# Patient Record
Sex: FEMALE | Employment: UNEMPLOYED | URBAN - METROPOLITAN AREA
[De-identification: names, ages, dates, MRNs, and addresses within clinical notes are randomized per-mention and may not be internally consistent; named-entity substitution may affect disease eponyms.]

---

## 2018-09-14 ENCOUNTER — TRANSFERRED RECORDS (OUTPATIENT)
Dept: HEALTH INFORMATION MANAGEMENT | Facility: CLINIC | Age: 58
End: 2018-09-14

## 2019-09-16 ENCOUNTER — TRANSFERRED RECORDS (OUTPATIENT)
Dept: HEALTH INFORMATION MANAGEMENT | Facility: CLINIC | Age: 59
End: 2019-09-16

## 2019-12-12 ENCOUNTER — TRANSCRIBE ORDERS (OUTPATIENT)
Dept: OTHER | Age: 59
End: 2019-12-12

## 2019-12-12 DIAGNOSIS — K43.9 VENTRAL HERNIA: Primary | ICD-10-CM

## 2019-12-16 ENCOUNTER — DOCUMENTATION ONLY (OUTPATIENT)
Dept: CARE COORDINATION | Facility: CLINIC | Age: 59
End: 2019-12-16

## 2019-12-18 ENCOUNTER — TRANSFERRED RECORDS (OUTPATIENT)
Dept: HEALTH INFORMATION MANAGEMENT | Facility: CLINIC | Age: 59
End: 2019-12-18

## 2020-01-03 ENCOUNTER — TELEPHONE (OUTPATIENT)
Dept: SURGERY | Facility: CLINIC | Age: 60
End: 2020-01-03

## 2020-01-03 NOTE — TELEPHONE ENCOUNTER
Pre Visit Call and Assessment    Date of call:  01/03/2020    Phone numbers:  Home number on file [phone] (home)    Reached patient/confirmed appointment:  Yes  Patient care team/Primary provider:  No primary care provider on file.  Preferred outpatient pharmacy:  No Pharmacies Listed  Referred to:  Dr. Néstor Roblero    Reason for visit:  Ventral hernia consult    Problem List:  There is no problem list on file for this patient.    Allergies:   Allergies not on file    History:      No past medical history on file.    No past surgical history on file.    No family history on file.    Social History     Tobacco Use     Smoking status: Not on file   Substance Use Topics     Alcohol use: Not on file       Patient instructions:    *  Bring outside medical records, images/disc, and/or studies

## 2020-01-06 ENCOUNTER — OFFICE VISIT (OUTPATIENT)
Dept: SURGERY | Facility: CLINIC | Age: 60
End: 2020-01-06
Payer: COMMERCIAL

## 2020-01-06 VITALS
DIASTOLIC BLOOD PRESSURE: 83 MMHG | WEIGHT: 221 LBS | HEIGHT: 63 IN | HEART RATE: 75 BPM | OXYGEN SATURATION: 97 % | SYSTOLIC BLOOD PRESSURE: 133 MMHG | BODY MASS INDEX: 39.16 KG/M2

## 2020-01-06 DIAGNOSIS — K43.2 INCISIONAL HERNIA, WITHOUT OBSTRUCTION OR GANGRENE: Primary | ICD-10-CM

## 2020-01-06 RX ORDER — SELENIUM 50 MCG
1 TABLET ORAL
COMMUNITY

## 2020-01-06 RX ORDER — LATANOPROST 50 UG/ML
SOLUTION/ DROPS OPHTHALMIC
COMMUNITY
Start: 2019-11-18

## 2020-01-06 RX ORDER — VIT C/B6/B5/MAGNESIUM/HERB 173 50-5-6-5MG
500 CAPSULE ORAL
COMMUNITY

## 2020-01-06 RX ORDER — CITALOPRAM HYDROBROMIDE 20 MG/1
20 TABLET ORAL
COMMUNITY
Start: 2015-12-04

## 2020-01-06 ASSESSMENT — MIFFLIN-ST. JEOR: SCORE: 1546.58

## 2020-01-06 ASSESSMENT — PAIN SCALES - GENERAL: PAINLEVEL: NO PAIN (0)

## 2020-01-06 NOTE — PROGRESS NOTES
Jessica Irwin comes to clinic with a recurrent ventral incisional hernia.  Her abdominal history started with hysterectomy.  This resulted in an incisional hernia that was repaired with mesh in 2007.  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  Dr. Phelps at the [facility] took down the fistula remove the mesh and did a primary ventral herniorrhaphy with PDS and interrupted Prolene suture.  This was complicated by wound infection and she had a long-term VAC in place.  She had several abdominal wall debridements and removal of suture.  Recently she is noticed a bulge in the upper abdomen.  It causes some pressure and difficulty breathing from time to time.  It is never been painful.    A/P: Recurrent ventral incisional hernia after a multiple operated abdominal wall.  This will need a mesh repair.  She is currently morbidly obese with BMI of 26 and she is a smoker.  I instructed her that she will need this completely stop smoking and lose at least 21 pounds prior to hernia repair.  At that time we would also need to get a CT scan of the abdomen for a roadmap and planning purposes.  She will return to see me in 6 months time.  I offered her dietitian consult but she would like to get that closer to home with her primary care physician.    I discussed the likely need for a repair with mesh, the multiple sites of repair that would be determined by the CT scan, and the risks of infection and recurrence especially related to her obesity and smoking.  She seems motivated and we will see her in 6 months time.    I spent 30 minutes with the patient over half that time discussing the risks benefits alternatives of surgery.    Danial Roblero MD    No past medical history on file.    No past surgical history on file.   Open cholecystectomy  Hysterectomy  Ventral incisional herniorrhaphy with mesh  Resection of enterocutaneous fistula, removal of mesh, and primary hernia repair 2019 [place]       Allergies   Allergen  "Reactions     Clarithromycin Hives and Itching     Hydrocodone-Acetaminophen Nausea and Vomiting     Morphine Nausea and Vomiting, Nausea and GI Disturbance     vomiting       Oxycodone-Acetaminophen Hives, Nausea and Vomiting and Other (See Comments)     vomiting       Propoxyphene N-Apap Hives, Nausea and Vomiting and Other (See Comments)     vomiting       Droperidol Anxiety and Itching     Other reaction(s): Confusion, Diaphoresis, Other (see comments)  Fight or floght  \"fight or flight mode\"           Current Outpatient Medications:      citalopram (CELEXA) 20 MG tablet, Take 20 mg by mouth, Disp: , Rfl:      Lactobacillus (ACIDOPHILUS) CAPS, Take 1 capsule by mouth, Disp: , Rfl:      latanoprost (XALATAN) 0.005 % ophthalmic solution, INSTILL 1 DROP INTO EACH EYE AT BEDTIME, Disp: , Rfl:      Turmeric 500 MG CAPS, Take 500 mg by mouth, Disp: , Rfl:      vitamin B-12 (CYANOCOBALAMIN) 100 MCG tablet, Take 100 mcg by mouth daily, Disp: , Rfl:     family history is not on file.    Social History     Tobacco Use     Smoking status: Current Every Day Smoker     Packs/day: 1.00     Types: Cigarettes     Start date: 1975     Smokeless tobacco: Never Used     Tobacco comment: working on quitting with PCP   Substance Use Topics     Alcohol use: None     Drug use: None       EXAM  /83 (BP Location: Left arm, Patient Position: Chair, Cuff Size: Adult Regular)   Pulse 75   Ht 1.6 m (5' 3\")   Wt 100.2 kg (221 lb)   SpO2 97%   BMI 39.15 kg/m      Obese, midline scar, wide-based healed by secondary intention.  RUQ vertical scar.  LLQ horizontal scar  Hernia mass palpable in upper abdomen  "

## 2020-01-06 NOTE — LETTER
1/6/2020       RE: Jessica Irwin  [address] [address] [address]     Dear Colleague,    Thank you for referring your patient, Jessica Irwin, to the [facility] GENERAL SURGERY at [facility]. Please see a copy of my visit note below.    Jessica Irwin comes to clinic with a recurrent ventral incisional hernia.  Her abdominal history started with hysterectomy.  This resulted in an incisional hernia that was repaired with mesh in 2007.  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  Dr. Phelps at the [facility] took down the fistula remove the mesh and did a primary ventral herniorrhaphy with PDS and interrupted Prolene suture.  This was complicated by wound infection and she had a long-term VAC in place.  She had several abdominal wall debridements and removal of suture.  Recently she is noticed a bulge in the upper abdomen.  It causes some pressure and difficulty breathing from time to time.  It is never been painful.    A/P: Recurrent ventral incisional hernia after a multiple operated abdominal wall.  This will need a mesh repair.  She is currently morbidly obese with BMI of 26 and she is a smoker.  I instructed her that she will need this completely stop smoking and lose at least 21 pounds prior to hernia repair.  At that time we would also need to get a CT scan of the abdomen for a roadmap and planning purposes.  She will return to see me in 6 months time.  I offered her dietitian consult but she would like to get that closer to home with her primary care physician.    I discussed the likely need for a repair with mesh, the multiple sites of repair that would be determined by the CT scan, and the risks of infection and recurrence especially related to her obesity and smoking.  She seems motivated and we will see her in 6 months time.    I spent 30 minutes with the patient over half that time discussing the risks benefits alternatives of surgery.    Danial HOFFMAN  "MD Osbaldo    No past medical history on file.    No past surgical history on file.   Open cholecystectomy  Hysterectomy  Ventral incisional herniorrhaphy with mesh  Resection of enterocutaneous fistula, removal of mesh, and primary hernia repair 2019 [place]       Allergies   Allergen Reactions     Clarithromycin Hives and Itching     Hydrocodone-Acetaminophen Nausea and Vomiting     Morphine Nausea and Vomiting, Nausea and GI Disturbance     vomiting       Oxycodone-Acetaminophen Hives, Nausea and Vomiting and Other (See Comments)     vomiting       Propoxyphene N-Apap Hives, Nausea and Vomiting and Other (See Comments)     vomiting       Droperidol Anxiety and Itching     Other reaction(s): Confusion, Diaphoresis, Other (see comments)  Fight or floght  \"fight or flight mode\"           Current Outpatient Medications:      citalopram (CELEXA) 20 MG tablet, Take 20 mg by mouth, Disp: , Rfl:      Lactobacillus (ACIDOPHILUS) CAPS, Take 1 capsule by mouth, Disp: , Rfl:      latanoprost (XALATAN) 0.005 % ophthalmic solution, INSTILL 1 DROP INTO EACH EYE AT BEDTIME, Disp: , Rfl:      Turmeric 500 MG CAPS, Take 500 mg by mouth, Disp: , Rfl:      vitamin B-12 (CYANOCOBALAMIN) 100 MCG tablet, Take 100 mcg by mouth daily, Disp: , Rfl:     family history is not on file.    Social History     Tobacco Use     Smoking status: Current Every Day Smoker     Packs/day: 1.00     Types: Cigarettes     Start date: 1975     Smokeless tobacco: Never Used     Tobacco comment: working on quitting with PCP   Substance Use Topics     Alcohol use: None     Drug use: None       EXAM  /83 (BP Location: Left arm, Patient Position: Chair, Cuff Size: Adult Regular)   Pulse 75   Ht 1.6 m (5' 3\")   Wt 100.2 kg (221 lb)   SpO2 97%   BMI 39.15 kg/m       Obese, midline scar, wide-based healed by secondary intention.  RUQ vertical scar.  LLQ horizontal scar  Hernia mass palpable in upper abdomen    Again, thank you for allowing me to " participate in the care of your patient.      Sincerely,    Danial Roblero MD

## 2020-01-06 NOTE — NURSING NOTE
"Chief Complaint   Patient presents with     Consult     new pt here for ventral hernia consult, CT in Epic and s/p repair 2006 and then mesh removal late 2017 at [facility]       Vitals:    01/06/20 1129   BP: 133/83   BP Location: Left arm   Patient Position: Chair   Cuff Size: Adult Regular   Pulse: 75   SpO2: 97%   Weight: 100.2 kg (221 lb)   Height: 1.6 m (5' 3\")       Body mass index is 39.15 kg/m .    True Mckinney, EMT    "

## 2020-01-06 NOTE — PATIENT INSTRUCTIONS
You met with Dr. Néstor Roblero.      Today's visit instructions:    Dr. Roblero would like to see you back in clinic in 6 months. You can call to schedule this follow up in mid-May or early . If you have reached your goal weight prior to this appointment, please call the RN line so they can place an order for a CT.     Please work on:  1. Weight loss of 21 lbs to a goal weight of 200 lbs.  2. Smoking cessation.      If you have questions please contact Gio RN or Kallie RN during regular clinic hours, Monday through Friday 7:30 AM - 4:00 PM, or you can contact us via imagoo at anytime.       If you have urgent needs after-hours, weekends, or holidays please call the hospital at [phone] and ask to speak with our on-call General Surgery Team.    Appointment [phone], option #1   Nurse Advice (Gio or Kallie): [phone]   Surgery Scheduler (Jesusita): [phone]  Fax: [phone]

## 2020-04-15 ENCOUNTER — TRANSFERRED RECORDS (OUTPATIENT)
Dept: HEALTH INFORMATION MANAGEMENT | Facility: CLINIC | Age: 60
End: 2020-04-15

## 2020-04-17 ENCOUNTER — TRANSFERRED RECORDS (OUTPATIENT)
Dept: HEALTH INFORMATION MANAGEMENT | Facility: CLINIC | Age: 60
End: 2020-04-17

## 2020-04-17 ENCOUNTER — MEDICAL CORRESPONDENCE (OUTPATIENT)
Dept: HEALTH INFORMATION MANAGEMENT | Facility: CLINIC | Age: 60
End: 2020-04-17

## 2020-04-24 ENCOUNTER — MEDICAL CORRESPONDENCE (OUTPATIENT)
Dept: HEALTH INFORMATION MANAGEMENT | Facility: CLINIC | Age: 60
End: 2020-04-24

## 2020-05-01 ENCOUNTER — TELEPHONE (OUTPATIENT)
Dept: SURGERY | Facility: CLINIC | Age: 60
End: 2020-05-01

## 2020-05-01 NOTE — TELEPHONE ENCOUNTER
Returned call to patient.  She reports that the hernia has become increasingly tender and larger in size.  She is tolerating po, up ad marya, and her bowels are moving.  She denies obstructive symptoms or fever.  She continues to smoke and has not net her weight loss goal.    Recommended that she retrial use of an abdominal binder as tolerated, OTC analgesics PRN per package instructions, and she can alternate heat/ice to the area PRN (protecting skin from injury).  New images pushed into PACS.    Discussed symptoms that require emergent medical care.  All of her questions were answered.    Video appointment arranged with Dr. Roblero 5/4 at 1330.

## 2020-05-01 NOTE — TELEPHONE ENCOUNTER
[facility] Call Center    Phone Message    May a detailed message be left on voicemail: yes     Reason for Call: Other: Pt calling wanting Dr. Roblero to know that notes from referring provider and CT image has been recievede. Pt is concerned she is expirencing Symptoms getting worse pockets of fluid under the skin now is having pain for last 3 days, 1-10 pain is at a 5 consistently especially when moving around, moving down to pubic area.. Pt asking for a call back as soon as possible about symptoms and next steps     Action Taken: Message routed to:  [facility] Gen [facility]    Travel Screening: Not Applicable

## 2020-05-01 NOTE — TELEPHONE ENCOUNTER
Action 05/01/20 12 PM - Lizzie   Action Taken  Imaging disc and report received from [facility] and sent to [facility] to be uploaded into PACs.

## 2020-05-01 NOTE — TELEPHONE ENCOUNTER
"[facility] Call Center    Phone Message    May a detailed message be left on voicemail: yes     Reason for Call: pt reported that she received a voice msg this morning from our clinic stating that clinic had received more paperwork and that Dr. Roblero wanted pt to make an appt with him. Msg did not indicate whether or not it was an \"in clinic\" appt or a video visit. Please determine which visit is needed so pt can get scheduled with Dr. Roblero. Thank you.    Action Taken: Message routed to:  [facility]  General Surgery    Travel Screening: Not Applicable                                                                      "

## 2020-05-04 ENCOUNTER — VIRTUAL VISIT (OUTPATIENT)
Dept: SURGERY | Facility: CLINIC | Age: 60
End: 2020-05-04
Payer: COMMERCIAL

## 2020-05-04 DIAGNOSIS — K43.2 INCISIONAL HERNIA, WITHOUT OBSTRUCTION OR GANGRENE: Primary | ICD-10-CM

## 2020-05-04 ASSESSMENT — PAIN SCALES - GENERAL: PAINLEVEL: MODERATE PAIN (5)

## 2020-05-04 NOTE — PATIENT INSTRUCTIONS
You met with Dr. Néstor Roblero.      Today's visit instructions:    Please continue to work on smoking cessation. You will need to have be nicotine free for 6 weeks. Once you have reached this goal, please contact the office to get set up with a Nicotine test. You will also need to have a PAC visit prior to surgery. Our office will help arrange this once you have a negative test.     Dr. Roblero will want to review your CT scan from . He may want a repeat CT depending on the findings. Our office will let you know his recommendations.       If you have questions please contact Gio RN or Kallie RN during regular clinic hours, Monday through Friday 7:30 AM - 4:00 PM, or you can contact us via Amaru at anytime.       If you have urgent needs after-hours, weekends, or holidays please call the hospital at [phone] and ask to speak with our on-call General Surgery Team.    Appointment [phone], option #1   Nurse Advice (Gio or Kallie): [phone]   Surgery Scheduler (Jesusita): [phone]  Fax: [phone]

## 2020-05-04 NOTE — PROGRESS NOTES
"Jessica Irwin is a 59 year old female who is being evaluated via a billable video visit.      The patient has been notified of following:     \"This video visit will be conducted via a call between you and your physician/provider. We have found that certain health care needs can be provided without the need for an in-person physical exam.  This service lets us provide the care you need with a video conversation.  If a prescription is necessary we can send it directly to your pharmacy.  If lab work is needed we can place an order for that and you can then stop by our lab to have the test done at a later time.    Video visits are billed at different rates depending on your insurance coverage.  Please reach out to your insurance provider with any questions.    If during the course of the call the physician/provider feels a video visit is not appropriate, you will not be charged for this service.\"    Patient has given verbal consent for Video visit? Yes    How would you like to obtain your AVS? [employer]    Patient would like the video invitation sent by: Text to cell phone: [phone]    Will anyone else be joining your video visit? No        Video-Visit Details    Type of service:  Video Visit    Video Start Time: 1336  Video End Time: 1346    Originating Location (pt. Location): Home    Distant Location (provider location):  [facility] SURGERY     Platform used for Video Visit: Webee    Ms. Irwin was seen in January with a recurrent ventral incisional hernia.  See my note from then for further details.  She reports that she felt discomfort in the lower abdomen, suprapubic area with some redness and warmth.  She was seen in a local hospital and had a CT scan.  She reports it showed inflammation and maybe some fluid and possibly a fistula forming.  Since she has felt better.  No change in bowel or bladder function.  No fevers or chills.   She is still smoking.  I don't have the images to " review.  Recommendation is to quite smoking.  We will assess her for surgery when she has quit smoking for 6 weeks.  We made need to repeat the CT imaging to evaluate for active inflammation/infection in the pannus depending on what is seen on exam and on the previous imaging.        Danial Roblero MD

## 2020-05-04 NOTE — NURSING NOTE
Chief Complaint   Patient presents with     RECHECK     Pt having virtual follow up       There were no vitals filed for this visit.    There is no height or weight on file to calculate BMI.      LIZA Barrow NREMT

## 2020-06-12 ENCOUNTER — TELEPHONE (OUTPATIENT)
Dept: SURGERY | Facility: CLINIC | Age: 60
End: 2020-06-12

## 2020-06-12 NOTE — TELEPHONE ENCOUNTER
Patient met virtually with Dr. Roblero recently via a video visit.  She is calling at this time requesting to move forward planning for a hernia repair.      Patient did have a [facility]  The report and images are pushed to this system.  Jessica reports that she quit smoking about 8 weeks ago but is still chewing Nicorette Gum.  She is aware that a urine nicotine level will need to be obtained and she will also need to stop the gum.    An in-person visit was arranged on 7/20 with Dr. Roblero for an exam and lab test.

## 2020-06-12 NOTE — TELEPHONE ENCOUNTER
[facility] Call Center    Phone Message    May a detailed message be left on voicemail: yes     Reason for Call: Other: Pt would like Dr Roblero to set up orders for her to have blood work and CT done for her.  She also needs a call back to discuss this growth she mentioned to the Dr on last visit.  It is grown and she is concerned about it.  Pt wants to know if the Dr needs to see her for this growth.  Please call     Action Taken: Message routed to:  [facility] surgery    Travel Screening: Not Applicable

## 2020-06-17 ENCOUNTER — PATIENT OUTREACH (OUTPATIENT)
Dept: SURGERY | Facility: CLINIC | Age: 60
End: 2020-06-17

## 2020-06-17 NOTE — PROGRESS NOTES
Patient is scheduled to meet in-person with Dr. Roblero on 7/20.  Unfortunately, there is a scheduling conflict and this appointment will need to be rescheduled.  Appointment moved to 7/6 at 1 PM.    Attempted to reach patient with no answer. LM on VM to call office.  Direct contact information provided.

## 2020-06-17 NOTE — PROGRESS NOTES
Patient returning call. Offered to reschedule her for an appointment with Dr. Roblero 7/6. She states she will be on vacation 6/23-7/7. Discussed that Dr. Roblero doesn't have another in clinic appointment until 8/24. She has been scheduled for 11 am. She is aware she will be contacted if Dr. Roblero would like an updated CT prior to the appointment.

## 2020-06-22 ENCOUNTER — TELEPHONE (OUTPATIENT)
Dept: SURGERY | Facility: CLINIC | Age: 60
End: 2020-06-22

## 2020-06-22 NOTE — TELEPHONE ENCOUNTER
This writer called the patient to discuss rescheduling an appointment for her to see a general surgeon for a hernia consult. There was no answer and this writer left a message that confirmed appointment on August / 10 / 2020 at 11:00 AM with Dr. Néstor Roblero. Patient was asked to call if she needs to reschedule her consult.

## 2020-06-29 ENCOUNTER — PATIENT OUTREACH (OUTPATIENT)
Dept: SURGERY | Facility: CLINIC | Age: 60
End: 2020-06-29

## 2020-06-29 NOTE — PROGRESS NOTES
Dr. Roblero has reviewed patients chart/Ct images. He states she does not need to have one done prior to her appointment in August. He will decide after that visit if any imaging needs to be done.

## 2020-08-07 ENCOUNTER — PATIENT OUTREACH (OUTPATIENT)
Dept: SURGERY | Facility: CLINIC | Age: 60
End: 2020-08-07

## 2020-08-07 NOTE — PROGRESS NOTES
Pre Visit Call and Assessment    Date of call:  08/07/2020    Phone numbers:  Home number on file [phone] (home)    Reached patient/confirmed appointment:  No - left message:   on voicemail  Patient care team/Primary provider:  No Ref-Primary, Physician  Preferred outpatient pharmacy:    [facility] [address] [place] [id number]  Phone: [phone] Fax: [phone]    Referred to:  Dr. Néstor Roblero    Reason for visit:  Incisional hernia

## 2020-08-10 ENCOUNTER — OFFICE VISIT (OUTPATIENT)
Dept: SURGERY | Facility: CLINIC | Age: 60
End: 2020-08-10
Payer: COMMERCIAL

## 2020-08-10 VITALS
SYSTOLIC BLOOD PRESSURE: 136 MMHG | DIASTOLIC BLOOD PRESSURE: 82 MMHG | BODY MASS INDEX: 40.18 KG/M2 | HEART RATE: 70 BPM | TEMPERATURE: 98.2 F | OXYGEN SATURATION: 96 % | WEIGHT: 226.8 LBS | HEIGHT: 63 IN

## 2020-08-10 DIAGNOSIS — K43.2 INCISIONAL HERNIA, WITHOUT OBSTRUCTION OR GANGRENE: Primary | ICD-10-CM

## 2020-08-10 SDOH — HEALTH STABILITY: MENTAL HEALTH: HOW OFTEN DO YOU HAVE A DRINK CONTAINING ALCOHOL?: NEVER

## 2020-08-10 ASSESSMENT — PAIN SCALES - GENERAL: PAINLEVEL: NO PAIN (0)

## 2020-08-10 ASSESSMENT — MIFFLIN-ST. JEOR: SCORE: 1572.89

## 2020-08-10 NOTE — NURSING NOTE
"Chief Complaint   Patient presents with     Hernia     Follow up to discuss hernia.       Vitals:    08/10/20 1103   BP: 136/82   BP Location: Left arm   Patient Position: Sitting   Cuff Size: Adult Large   Pulse: 70   Temp: 98.2  F (36.8  C)   TempSrc: Oral   SpO2: 96%   Weight: 102.9 kg (226 lb 12.8 oz)   Height: 1.6 m (5' 3\")       Body mass index is 40.18 kg/m .    Francisco Mejia LPN                     "

## 2020-08-10 NOTE — PATIENT INSTRUCTIONS
You met with Dr. Néstor Roblero.      Today's visit instructions:    Return to the Surgery Clinic in 6 months for a follow up on your weight loss and smoking cessation. Our office will contact you to help schedule once Dr. Roblero's schedule is available.     Margarita, our Wound Care RN will see you today for your abdominal wound. Continue to follow with your local wound clinic until your wound heals.    We have placed a referral for the [facility]. You can stop up front today to help arrange an appointment.       If you have questions please contact Gio GARCIA or Kallie RN during regular clinic hours, Monday through Friday 7:30 AM - 4:00 PM, or you can contact us via CircleCI at anytime.       If you have urgent needs after-hours, weekends, or holidays please call the hospital at [phone] and ask to speak with our on-call General Surgery Team.    Appointment [phone], option #1   Nurse Advice (Gio or Kallie): [phone]   Surgery Scheduler (Jesusita): [phone]  Fax: [phone]

## 2020-08-10 NOTE — LETTER
8/10/2020       RE: Jessica Irwin  [address] [facility] [address]     Dear Colleague,    Thank you for referring your patient, Jessica Irwin, to the [facility] GENERAL SURGERY at [facility]. Please see a copy of my visit note below.    Jessica Irwin returns to seem me for a ventral incisional hernia.  I first saw her about a year ago.  She had a ventral incisional hernia in the setting of obesity and smoking.  We instructed her to stop smoking and lose weight.  Her weight loss goal is 21 pounds.  Since then she has been able to stop smoking but has not lost any weight.  Additionally she had an episode of a lower abdominal wall abscess that required surgical drainage.  She has been doing dressing changes to that area and it is mostly closed.  She has had no complications related to the hernia.    A/P: Longstanding ventral incisional hernia.  I think we can do mesh repair with some separation of parts.  Patient has not been successful at losing weight.  I think at this point we can move forward because she has stopped smoking.  I would like to see the lower abdominal wall abscess completely closed and without recurrence for a period of time.  Because we have some time before surgery we will refer her to the medical weight loss clinic to see if they can help.  We will see her again in about 6 months time to schedule surgery after we verify that the abdominal wall abscess is completely closed.    I spent 20 minutes with the patient and over half that time in counseling.    Danial Roblero MD    No past medical history on file.    No past surgical history on file.       Allergies   Allergen Reactions     Clarithromycin Hives and Itching     Hydrocodone-Acetaminophen Nausea and Vomiting     Morphine Nausea and Vomiting, Nausea and GI Disturbance     vomiting       Oxycodone-Acetaminophen Hives, Nausea and Vomiting and Other (See Comments)     vomiting       Propoxyphene  "N-Apap Hives, Nausea and Vomiting and Other (See Comments)     vomiting       Droperidol Anxiety and Itching     Other reaction(s): Confusion, Diaphoresis, Other (see comments)  Fight or floght  \"fight or flight mode\"           Current Outpatient Medications:      citalopram (CELEXA) 20 MG tablet, Take 20 mg by mouth, Disp: , Rfl:      Lactobacillus (ACIDOPHILUS) CAPS, Take 1 capsule by mouth, Disp: , Rfl:      latanoprost (XALATAN) 0.005 % ophthalmic solution, INSTILL 1 DROP INTO EACH EYE AT BEDTIME, Disp: , Rfl:      Turmeric 500 MG CAPS, Take 500 mg by mouth, Disp: , Rfl:      vitamin B-12 (CYANOCOBALAMIN) 100 MCG tablet, Take 100 mcg by mouth daily, Disp: , Rfl:     family history is not on file.    Social History     Tobacco Use     Smoking status: Former Smoker     Packs/day: 1.00     Types: Cigarettes     Start date:      Last attempt to quit: 2020     Years since quittin.3     Smokeless tobacco: Never Used   Substance Use Topics     Alcohol use: Not Currently     Frequency: Never     Drug use: Never       EXAM  /82 (BP Location: Left arm, Patient Position: Sitting, Cuff Size: Adult Large)   Pulse 70   Temp 98.2  F (36.8  C) (Oral)   Ht 1.6 m (5' 3\")   Wt 102.9 kg (226 lb 12.8 oz)   SpO2 96%   BMI 40.18 kg/m      abd obese, soft, nontender.  Hernia soft and reducible.  Lower midline open wound. Small and granulating.    Again, thank you for allowing me to participate in the care of your patient.      Sincerely,    Danial Roblero MD      "

## 2020-08-10 NOTE — PROGRESS NOTES
"Jessica Irwin returns to seem me for a ventral incisional hernia.  I first saw her about a year ago.  She had a ventral incisional hernia in the setting of obesity and smoking.  We instructed her to stop smoking and lose weight.  Her weight loss goal is 21 pounds.  Since then she has been able to stop smoking but has not lost any weight.  Additionally she had an episode of a lower abdominal wall abscess that required surgical drainage.  She has been doing dressing changes to that area and it is mostly closed.  She has had no complications related to the hernia.    A/P: Longstanding ventral incisional hernia.  I think we can do mesh repair with some separation of parts.  Patient has not been successful at losing weight.  I think at this point we can move forward because she has stopped smoking.  I would like to see the lower abdominal wall abscess completely closed and without recurrence for a period of time.  Because we have some time before surgery we will refer her to the medical weight loss clinic to see if they can help.  We will see her again in about 6 months time to schedule surgery after we verify that the abdominal wall abscess is completely closed.    I spent 20 minutes with the patient and over half that time in counseling.    Danial Roblero MD    No past medical history on file.    No past surgical history on file.       Allergies   Allergen Reactions     Clarithromycin Hives and Itching     Hydrocodone-Acetaminophen Nausea and Vomiting     Morphine Nausea and Vomiting, Nausea and GI Disturbance     vomiting       Oxycodone-Acetaminophen Hives, Nausea and Vomiting and Other (See Comments)     vomiting       Propoxyphene N-Apap Hives, Nausea and Vomiting and Other (See Comments)     vomiting       Droperidol Anxiety and Itching     Other reaction(s): Confusion, Diaphoresis, Other (see comments)  Fight or floght  \"fight or flight mode\"           Current Outpatient Medications:      citalopram " "(CELEXA) 20 MG tablet, Take 20 mg by mouth, Disp: , Rfl:      Lactobacillus (ACIDOPHILUS) CAPS, Take 1 capsule by mouth, Disp: , Rfl:      latanoprost (XALATAN) 0.005 % ophthalmic solution, INSTILL 1 DROP INTO EACH EYE AT BEDTIME, Disp: , Rfl:      Turmeric 500 MG CAPS, Take 500 mg by mouth, Disp: , Rfl:      vitamin B-12 (CYANOCOBALAMIN) 100 MCG tablet, Take 100 mcg by mouth daily, Disp: , Rfl:     family history is not on file.    Social History     Tobacco Use     Smoking status: Former Smoker     Packs/day: 1.00     Types: Cigarettes     Start date:      Last attempt to quit: 2020     Years since quittin.3     Smokeless tobacco: Never Used   Substance Use Topics     Alcohol use: Not Currently     Frequency: Never     Drug use: Never       EXAM  /82 (BP Location: Left arm, Patient Position: Sitting, Cuff Size: Adult Large)   Pulse 70   Temp 98.2  F (36.8  C) (Oral)   Ht 1.6 m (5' 3\")   Wt 102.9 kg (226 lb 12.8 oz)   SpO2 96%   BMI 40.18 kg/m      abd obese, soft, nontender.  Hernia soft and reducible.  Lower midline open wound. Small and granulating.  "

## 2020-08-12 ENCOUNTER — TELEPHONE (OUTPATIENT)
Dept: SURGERY | Facility: CLINIC | Age: 60
End: 2020-08-12

## 2020-08-12 NOTE — TELEPHONE ENCOUNTER
[facility] Call Center    Phone Message    May a detailed message be left on voicemail: yes     Reason for Call: Order(s): Other:   Reason for requested: Nicotine and Cotinine Urine   Date needed: ASAP  Provider name: Dr. Roblero  Please fax orders to Dr. Navas at [phone]    Action Taken: Message routed to:  [facility]    Travel Screening: Not Applicable

## 2020-08-17 ENCOUNTER — TELEPHONE (OUTPATIENT)
Dept: SURGERY | Facility: CLINIC | Age: 60
End: 2020-08-17

## 2020-08-17 ENCOUNTER — TRANSFERRED RECORDS (OUTPATIENT)
Dept: HEALTH INFORMATION MANAGEMENT | Facility: CLINIC | Age: 60
End: 2020-08-17

## 2020-08-17 NOTE — TELEPHONE ENCOUNTER
[facility] Call Center    Phone Message    May a detailed message be left on voicemail: yes     Reason for Call: Other: Patient called to let Dr. Roblero know that the Nicotine and Continine Urine test was done today at patient's primary care office.  They are sending it out for analyis and then Dr. Roblero should get results in 1-2 days.  Any questions, please follow up with patient.  Thank you!     Action Taken: Message routed to:  [facility]    Travel Screening: Not Applicable

## 2020-08-26 ENCOUNTER — TELEPHONE (OUTPATIENT)
Dept: ENDOCRINOLOGY | Facility: CLINIC | Age: 60
End: 2020-08-26

## 2020-08-26 NOTE — TELEPHONE ENCOUNTER
Called and offered to get patient set up for MyChart so they can complete their questionnaires. LVM for patient to sign up via text link.  Bridget Bo, EMT

## 2020-08-27 ENCOUNTER — VIRTUAL VISIT (OUTPATIENT)
Dept: ENDOCRINOLOGY | Facility: CLINIC | Age: 60
End: 2020-08-27
Attending: SURGERY
Payer: COMMERCIAL

## 2020-08-27 VITALS — BODY MASS INDEX: 39.69 KG/M2 | WEIGHT: 224 LBS | HEIGHT: 63 IN

## 2020-08-27 DIAGNOSIS — E11.9 TYPE 2 DIABETES MELLITUS WITHOUT COMPLICATION, WITHOUT LONG-TERM CURRENT USE OF INSULIN (H): Primary | ICD-10-CM

## 2020-08-27 RX ORDER — LIRAGLUTIDE 6 MG/ML
INJECTION SUBCUTANEOUS
Qty: 9 ML | Refills: 1 | Status: SHIPPED | OUTPATIENT
Start: 2020-08-27 | End: 2020-08-31

## 2020-08-27 RX ORDER — PEN NEEDLE, DIABETIC 31 GX5/16"
NEEDLE, DISPOSABLE MISCELLANEOUS
Qty: 100 EACH | Refills: 1 | Status: SHIPPED | OUTPATIENT
Start: 2020-08-27 | End: 2021-01-19

## 2020-08-27 ASSESSMENT — MIFFLIN-ST. JEOR: SCORE: 1560.19

## 2020-08-27 ASSESSMENT — PAIN SCALES - GENERAL: PAINLEVEL: NO PAIN (0)

## 2020-08-27 NOTE — NURSING NOTE
"Chief Complaint   Patient presents with     Consult     New appointment.       Vitals:    08/27/20 1531   Weight: 101.6 kg (224 lb)   Height: 1.6 m (5' 3\")       Body mass index is 39.68 kg/m .                            MASON MORELOS, EMT    "

## 2020-08-27 NOTE — PATIENT INSTRUCTIONS
"Thank you for allowing us the privilege of caring for you. We hope we provided you with the excellent service you deserve.   Please let us know if there is anything else we can do for you so that we can be sure you are completely satisfied with your care experience.    To ensure the quality of our services you may be receiving a patient satisfaction survey from an independent patient satisfaction monitoring company.    The greatest compliment you can give is a \"Likely to Recommend\"    Your visit was with Jess Morillo NP today.    Instructions per today's visit:   -Do food Journal x 1 week   -Follow up with Dietitian after food journal  -Start Victoza  -Follow up in 2 months     Please call our contact center at [phone] to schedule your next appointments.  To find a lab location near you, please call [phone].  For any nursing questions or concerns call Shereen Gauthier LPN at [phone] or Lizzie Valle RN at [phone]  Please call during clinic hours Monday through Friday 8:00a - 4:00p if you have questions or you can contact us via [employer] at anytime and we will reply during clinic hours.    Lab results will be communicated through My Chart or letter (if My Chart not used). Please call the clinic if you have not received communication after 1 week or if you have any questions.?  Clinic Fax: [phone]  ______________________________________________________________________________________________________________________  Meal Replacement Products:    Here is the link to our new e-store where you can purchase our meal replacement products    [facility] E-Store  [email or web address]    The one week starter kit is a great way to sample a variety of products and see what works for you.    If you want more information about the product go to: [employer] Meals  [email or web address]    If you are an employee or [facility] or [facility] please contact your care team " for a 10% estore discount    Free Shipping for orders over $75     Benefits of meal replacements products:    Portion and calorie control  Improved nutrition  Structured eating  Simplified food choices  Avoid contact with trigger foods  _________________________________________________________________________________________________________________________________  Interested in working with a health ?  Health coaches work with you to improve your overall health and wellbeing.  They look at the whole person, and may involve discussion of different areas of life, including, but not limited to the four pillars of health (sleep, exercise, nutrition, and stress management). Discuss with your care team if you would like to start working a health .  Health Coaching-3 Pack: Schedule by calling [phone]    $99 for three health coaching visits    Visits may be done in person or via phone    Coaching is a partnership between the  and the client; Coaches do not prescribe or diagnose    Coaching helps inspire the client to reach his/her personal goals   _________________________________________________________________________________________________________________________________  Healthy Lifestyle Support Group   [facility] [place] Weight Management Program  Virtual Support Group Now Available    60 minutes of small group guided discussion, support and resources    Led by Health , Miriam Hamm    For questions, and to receive the invite information, contact Miriam at [email or web address]    All our welcome!    One Friday per month, 12:30pm to 1:30pm  SELF COMPASSION: July 31st  CREATING MY WELLNESS VISION: August 28th  MINDFULNESS PRACTICES FOR A CALM BODY & MIND: September 25th  MINDFUL EATING TOOLS: October 30th  HEALTHY& HAPPY HOLIDAYS: November 20th  OPEN FORUM: December  18th  _______________________________________________________________________________________________________________________________  Connections: Bariatric Care support group  Due to the Covid-19 restrictions, we will be doing our support group virtually using Microsoft Teams. You will need to get an invitation to the group from Faustino Soto, Ph.D., LP at [email or web address] and to learn about using Microsoft Teams. The next meeting is on Tuesday, July 14 between 6:30 and 8 PM and there will be no formal speaker.    This group meets the second Tuesday of each month from 6:30 to 8 PM and will be held again at [facility] in the [address] [place] [address] (A-B room) when the Covid-19 restrictions are lifted. The group is led by Faustino Soto, Ph.D., Licensed Psychologist, [facility] Comprehensive Weight Management Program. There is no cost for group participation and is open to all [facility] (and those external to this program) pre- and post-operative bariatric surgery patients as well as their support system.   _________________________________________________________________________________________________________________________________  [facility] Get Moving Program  Our team of physical therapists is trained to help you understand and take control of your condition. They will perform a thorough evaluation to determine your ability for activity and develop a customized plan to fit your goals and physical ability.  Scheduling: Unsure if the Get Moving program is right for you? Discuss the program with your medical provider or diabetes educator. You can also call us at [phone] to ask questions or schedule an appointment.   [facility] Get Moving Program  ______________________________________________________________________________________________________________________  [facility] Diabetes Prevention Program (DPP)  If you have prediabetes and  Medicare please contact us by Yamsafer or phone to learn more about our Diabetes Prevention Program  _______________________________________________________________________________________________________________________  Bluetooth Scale:    We hope to provide you with high quality telephone and virtual healthcare visits while social distancing for COVID-19 is necessary, as well as in the future when virtual visits may be more convenient for you.     Our technology team made it possible for Bluetooth scales to send weight measurements to our electronic medical record. This allows weights from you weighing at home to securely flow into the medical record, which will improve telephone and virtual visits.   Additionally, studies have shown that adults actually lose more weight when their weights are automatically sent to someone else, and also that this process is not stressful for those adults.    Below is a link for purchasing the scale, with a discount code for our patients. You may call your insurance company to see if they will reimburse you for the cost of the scale, as a piece of durable medical equipment. The scales only go up to a weight of 400 pounds. This is an issue and we are working with the developer on increasing this. We found no scales that go over 400lb that have blue-tooth for connecting to Yamsafer.    Scale to purchase: the [employer]  Body  Scale: https://www."[employer]/us/en/body/shop?[email or web address]=aw.ds    Discount Code: We have a discount code for our patients to bring the cost down to $50, Discount code is: [place]_Scale_20%off  Thank you,   [facility] Comprehensive Weight Management Team                              MEDICATION STARTED AT THIS APPOINTMENT    We are starting a GLP-1 (Glucagon-like Peptide-1) medication. Examples of this medication include Byetta, Bydureon, or Victoza, etc. The medication chosen will depend on  insurance coverage, and can be changed to the medication that is covered under your plan. These medications work the same, in that they can help you lose weight and control your blood sugar. One of the ways it works is by slowing down the rate that food leaves your stomach. You feel tony and will eat less.  It also helps regulate hormones that can help improve your blood sugars.    Usually short acting insulins or oral agents are stopped or decreased by the doctor at the appointment. Low blood sugars are rare but can happen if patients are on insulin or other oral agents. If you notice consistent low sugars or high sugars, your medication may need to be adjusted after your appointment. If this is the case, please call RN and provide her your blood sugar record from the last 3-4 days. The RN will get in touch with the doctor and call you back/VSportohart message with recommendations. We tolerate high sugars for a bit, so if sugars are running 180-200, this is ok. As weight starts dropping the blood sugars should too. If readings are consistently over 200 for 1-2 weeks, then you should call the doctor/nurse.    Types of GLP-1 medications include:    Victoza: Starting dose is 0.6 mg for a week, then 1.2 mg for a week, and then 1.8 mg thereafter (if prescribed by doctor). This medication is given daily. Pen needles need to be ordered also.    Ozempic: Starting dose is 0.25 mg once weekly for 4 weeks, and then increase to 0.5 mg weekly. If after 4 weeks of being on 0.5 mg weekly dosing and still wanting additional weight loss and/or blood sugar benefits, check with your doctor to see if they want to increase the dose to 1 mg weekly. Pen needles come in the Ozempic pen box.    Trulicity: Starting dose is 0.75 mg once weekly.  If after 1-3 months of being on 0.75 mg weekly and still wanting additional weight loss and/or blood sugar benefits, check with your doctor to see if they want to increase the dose to 1.5 mg  weekly.    Bydureon: Starting dose is 2 mg and is given weekly. The patient should pick one day a week and give the medication on that day. Pen needles need to be ordered also.    Byetta: Starting dose is 5 mcg twice daily. This is given for the first month. Check with the doctor after a month to see if they want the dose increased to 10 mcg BID. Pen needles need to be ordered also.     Side effects of GLP- Medications include: The most common side effects are all GI related and consist of: nausea, constipation, diarrhea, burping, or gassiness. Patients are advised to eat slowly and less, and nausea typically passes if people can stick it out.     The risk of pancreatitis (inflammation of the pancreas) has been associated with this type of medication, but is very rare.  If you have had pancreatitis in the past, this medication may not be for you. Please let us know about any past history of pancreas problems.      Symptoms of pancreatitis include: Pain in your upper stomach area which  may travel to your back and be worse after eating. Your stomach area may be tender to the touch.  You may have vomiting or nausea and/or have a fever. If you should develop any of these symptoms, stop the medication and contact your primary care doctor. They will do a blood test to check for pancreatitis.         There is a small chance you may have some low blood sugar after taking the medication.   The signs of low blood sugar are:  o Weakness  o Shaky   o Hungry  o Sweating  o Confusion      See below for ways to treat low blood sugar without adding in lots of extra calories.      Treating Low Blood Sugar    If you have symptoms of low blood sugar (sweating, shaking, dizzy, confused) eat 15 grams of carbs and wait 15 minutes:      Glucose Tabs are best for sugars under 70 -  Dex4 or BD Glucose tablets are good, you will need to take 3-4 of these to equal 15 grams.       One small box of raisins    4 oz fruit juice box or   cup  fruit juice    1 small apple    1 small banana      cup canned fruit in water      English muffin or a slice of bread with jelly     1 low fat frozen waffle with sugar-free syrup      cup cottage cheese with   cup frozen or fresh blueberries    1 cup skim or low-fat milk      cup whole grain cereal    4-6 crackers such as Triscuits      This medication is usually covered by insurance for patients with a diagnosis of Type 2 Diabetes. Depending on insurance coverage, the medication may be changed to a different formulary, but they all work in the same way. Sometimes a prior authorization is required, which may take up to 1-2 weeks for an insurance company to make a decision if they will cover the medication. Please be patient, you will be notified either way.     Call the nurse at [phone] if you have any questions or concerns. (Do not stop taking it if you don't think it's working. For some people it works without them knowing it.)     In order to get refills of this or any medication we prescribe you must be seen in the medical weight mgmt clinic every 2-4 months. Please have your pharmacy fax a refill request to [phone].

## 2020-08-27 NOTE — Clinical Note
Can you please rout this to whoever is in clinic Friday? This needs to be mailed to the patient. She will also likely need a prior auth for victoza- DMII. Thanks! KESHA Johnson CNP

## 2020-08-27 NOTE — PROGRESS NOTES
"Jessica Irwin is a 59 year old female who is being evaluated via a billable telephone visit.      The patient has been notified of following:     \"This telephone visit will be conducted via a call between you and your physician/provider. We have found that certain health care needs can be provided without the need for a physical exam.  This service lets us provide the care you need with a short phone conversation.  If a prescription is necessary we can send it directly to your pharmacy.  If lab work is needed we can place an order for that and you can then stop by our lab to have the test done at a later time.    Telephone visits are billed at different rates depending on your insurance coverage. During this emergency period, for some insurers they may be billed the same as an in-person visit.  Please reach out to your insurance provider with any questions.    If during the course of the call the physician/provider feels a telephone visit is not appropriate, you will not be charged for this service.\"    Patient has given verbal consent for Telephone visit?  Yes    What phone number would you like to be contacted at? [phone]    How would you like to obtain your AVS? [facility]    Phone call duration: 31 minutes    Jess Morillo NP  During this virtual visit the patient is located in [place], patient verifies this as the location during the entirety of this visit.   "

## 2020-08-27 NOTE — PROGRESS NOTES
"    New Medical Weight Management Consult    PATIENT:  Jessica Irwin  MRN:         [id number]  :         1960  HUSSEIN:         2020    Dear Dr. Navas,    I had the pleasure of seeing your patient, Jessica Irwin. Full intake/assessment was done to determine barriers to weight loss success and develop a treatment plan. Jessica Irwin is a 59 year old female interested in treatment of medical problems associated with excess weight. She has a height of 5' 3\", a weight of 224 lbs 0 oz, and the calculated Body mass index is 39.68 kg/m .    ASSESSMENT/PLAN:  Jessica is a patient with early onset obesity with significant element of familial/genetic influence and with current health consequences. She does need aggressive weight loss plan due to Need for Hernia Repair- increased risk for recurrence without weight loss.  Jessica Irwin eats a high carb diet and tends to snack/graze throughout day, rarely sitting to eat a true meal.    Her problem is complicated by gender and short stature    Her ability to lose weight is impacted by misinformation and strongly held beliefs about food and lack of education on nutrition and dietary needs.    PLAN:    Keep food diary for 1 weeks  Dietician visit of education    Aggressive  Ancillary testing:  N/A.  Food Plan:  Low carbohydrate but not Keto.   Activity Plan:  Increase as able.  Supplementary: dietitian  Medication:  The patient will begin medication in pursuit of improved medical status as influenced by body weight. She will start Victoza.  There is a mutual understanding of the goals and risks of this therapy. The patient is in agreement. She is educated on dosage regimen and possible side effects.      No orders of the defined types were placed in this encounter.      She has the following co-morbidities:       2020   I have the following health issues associated with obesity: Osteoarthritis (joint disease)   I have the following symptoms associated with " "obesity: None of the above       Patient Goals 8/27/2020   I am interested in having a healthier weight to diminish current health problems: Yes   I am interested in having a healthier weight in order to prevent future health problems: Yes   I am interested in having a healthier weight in order to have a future surgery: Yes   If yes, please indicate which surgery? hernia repair     Hernia revision done 2 years ago after significant infection from mesh, wound vac x 16 months. Now Healed. Was losing a lot of weight quickly in the beginning (70lbs). Has regained 30 of those lbs.     Ventral hernia needs repair now. Dr. Roblero would like her to lose at least 20lbs. Did quit smoking so surgeon is moving ahead with the plan.     Referring Provider 8/27/2020   Please name the provider who referred you to Medical Weight Management.  If you do not know, please answer: \"I Don't Know\". Dr. Roblero       Weight History 8/27/2020   How concerned are you about your weight? Very Concerned   Would you describe your weight gain as gradual? No   I became overweight: As a Child   The following factors have contributed to my weight gain:  Lack of Exercise   I have tried the following methods to lose weight: Watching Portions or Calories, Exercise, Weight Watchers, Atkins-type Diet (Low Carb/High Protein), Constance Jaret, Nutrisystem, Slim Fast or Other Liquid Diets, Meal Replacements   My lowest weight since age 18 was: 170   My highest weight since age 18 was: 245   The most weight I have ever lost was: (lbs) 100   I have the following family history of obesity/being overweight:  One or more of my siblings are overweight   Has anyone in your family had weight loss surgery? Yes   How has your weight changed over the last year?  Lost     Prior to 2 years ago, she was an insulin dependent diabetic. Doesn't want to be diabetic so she keeps diet very low carb- A1C last 5.8- Primary care provider Dr. Navas.     Diet Recall Review with " Patient 8/27/2020   Do you typically eat breakfast? Yes   If you do eat breakfast, what types of food do you eat? organic seeded bread   Do you typically eat lunch? Yes   Do you typically eat supper? Yes   If you do eat supper, what types of food do you typically eat? chicken or pork, fresh brat on the grill sometimes, rarely beef   Do you typically eat snacks? Yes   If you do snack, what types of food do you typically eat? dried fruit, chickpea snacks, nuts, sugar free canned fruit, ice water   Do you like vegetables?  Yes   Do you drink water? Yes   How many glasses of juice do you drink in a typical day? 0   How many of glasses of milk do you drink in a typical day? 0   How many 8oz glasses of sugar containing drinks such as Matthew-Aid/sweet tea do you drink in a day? 0   How many cans/bottles of sugar pop/soda/tea/sports drinks do you drink in a day? 0   How many cans/bottles of diet pop/soda/tea or sports drink do you drink in a day? 0   How often do you have a drink of alcohol? Monthly or Less   If you do drink, how many drinks might you have in a day? 1 or 2   0900 wakes up   1000 Breakfast- organic low carb bread 1 slice with deli meat OR breakfast cake (carrots grated, eggs, oatmeal, spices)  Snacks on fruit and veggies throughout the day   Dinner - sweet potato hashbrowns with meat and veggies   Snacks- keto cheese balls, dried beans, sugar free jello/ pudding, sugar free cool whip     Eating Habits 8/27/2020   Generally, my meals include foods like these: bread, pasta, rice, potatoes, corn, crackers, sweet dessert, pop, or juice. A Few Times a Week   Generally, my meals include foods like these: fried meats, brats, burgers, french fries, pizza, cheese, chips, or ice cream. A Few Times a Week   Eat fast food (like McDonalds, [employer], [employer]). Never   Eat at a buffet or sit-down restaurant. Never   Eat most of my meals in front of the TV or computer. Never   Often skip meals, eat at random times, have  no regular eating times. Never   Rarely sit down for a meal but snack or graze throughout.  Never   Eat extra snacks between meals. Less Than Weekly   Eat most of my food at the end of the day. Less Than Weekly   Eat in the middle of the night or wake up at night to eat. Never   Eat extra snacks to prevent or correct low blood sugar. Never   Eat to prevent acid reflux or stomach pain. Never   Worry about not having enough food to eat. Never   Have you been to the food shelf at least a few times this year? No   I eat when I am depressed. Less Than Weekly   I eat when I am stressed. Less Than Weekly   I eat when I am bored. Less Than Weekly   I eat when I am anxious. Less Than Weekly   I eat when I am happy or as a reward. Less Than Weekly   I feel hungry all the time even if I just have eaten. Never   Feeling full is important to me. Never   I finish all the food on my plate even if I am already full. Never   I can't resist eating delicious food or walk past the good food/smell. Less Than Weekly   I eat/snack without noticing that I am eating. Once a Week   I eat when I am preparing the meal. Less Than Weekly   I eat more than usual when I see others eating. Once a Week   I have trouble not eating sweets, ice cream, cookies, or chips if they are around the house. Never   I think about food all day. Less Than Weekly   Please list any other foods you crave? cold things, cold fruit etc.       Amount of Food 8/27/2020   I make myself vomit what I have eaten or use laxatives to get rid of food. Never   I eat a large amount of food, like a loaf of bread, a box of cookies, a pint/quart of ice cream, all at once. Never   I eat a large amount of food even when I am not hungry. Never   I eat rapidly. Monthly   I eat alone because I feel embarrassed and do not want others to see how much I have eaten. Never   I eat until I am uncomfortably full. Never   I feel bad, disgusted, or guilty after I overeat. Monthly   I make myself  vomit what I have eaten or use laxatives to get rid of food. Never       Activity/Exercise History 8/27/2020   How much of a typical 12 hour day do you spend sitting? Less Than Half the Day   How much of a typical 12 hour day do you spend lying down? Less Than Half the Day   How much of a typical day do you spend walking/standing? Most of the Day   How many hours (not including work) do you spend on the TV/Video Games/Computer/Tablet/Phone? 2-3 Hours   How many times a week are you active for the purpose of exercise? 6-7 Times a Week   How many total minutes do you spend doing some activity for the purpose of exercising when you exercise? More Than 30 Minutes       PAST MEDICAL HISTORY:  No past medical history on file.    Work/Social History Reviewed With Patient 8/27/2020   My employment status is: Unemployed   What is your marital status? /In a Relationship   If in a relationship, is your significant other overweight? Yes   Do you have children? Yes   If you have children, are they overweight? No   Who do you live with?     Are they supportive of your health goals? Yes   Who does the food shopping?  together       Mental Health History Reviewed With Patient 8/27/2020   Have you ever been physically or sexually abused? No   If yes, do you feel that the abuse is affecting your weight? No   If yes, would you like to talk to a counselor about the abuse? No   How often in the past 2 weeks have you felt little interest or pleasure in doing things? For Several Days   Over the past 2 weeks how often have you felt down, depressed, or hopeless? For Several Days       Sleep History Reviewed With Patient 8/27/2020   How many hours do you sleep at night? 9   Do you think that you snore loudly or has anybody ever heard you snore loudly (louder than talking or so loud it can be heard behind a shut door)? No   Has anyone seen or heard you stop breathing during your sleep? No   Do you often feel tired, fatigued, or  "sleepy during the day? No       MEDICATIONS:   Current Outpatient Medications   Medication Sig Dispense Refill     citalopram (CELEXA) 20 MG tablet Take 20 mg by mouth       Lactobacillus (ACIDOPHILUS) CAPS Take 1 capsule by mouth       latanoprost (XALATAN) 0.005 % ophthalmic solution INSTILL 1 DROP INTO EACH EYE AT BEDTIME       Turmeric 500 MG CAPS Take 500 mg by mouth       vitamin B-12 (CYANOCOBALAMIN) 100 MCG tablet Take 100 mcg by mouth daily         ALLERGIES:   Allergies   Allergen Reactions     Clarithromycin Hives and Itching     Hydrocodone-Acetaminophen Nausea and Vomiting     Morphine Nausea and Vomiting, Nausea and GI Disturbance     vomiting       Oxycodone-Acetaminophen Hives, Nausea and Vomiting and Other (See Comments)     vomiting       Propoxyphene N-Apap Hives, Nausea and Vomiting and Other (See Comments)     vomiting       Droperidol Anxiety and Itching     Other reaction(s): Confusion, Diaphoresis, Other (see comments)  Fight or floght  \"fight or flight mode\"         PHYSICAL EXAM:  patient reports central obesity    FOLLOW-UP:   8 weeks.    TIME: 30 min spent on evaluation, management, counseling, education, & motivational interviewing with greater than 50 % of the total time was spent on counseling and coordinating care    Sincerely,    Jess Morillo NP      "

## 2020-08-27 NOTE — LETTER
"2020       RE: Jessica Irwin  [address] [facility] [address]     Dear Colleague,    Thank you for referring your patient, Jessica Irwin, to the [facility] MEDICAL WEIGHT MANAGEMENT at [facility]. Please see a copy of my visit note below.        New Medical Weight Management Consult    PATIENT:  Jessica Irwin  MRN:         [id number]  :         1960  HUSSEIN:         2020    Dear Dr. Navas,    I had the pleasure of seeing your patient, Jessica Irwin. Full intake/assessment was done to determine barriers to weight loss success and develop a treatment plan. Jessica Irwin is a 59 year old female interested in treatment of medical problems associated with excess weight. She has a height of 5' 3\", a weight of 224 lbs 0 oz, and the calculated Body mass index is 39.68 kg/m .    ASSESSMENT/PLAN:  Jessica is a patient with early onset obesity with significant element of familial/genetic influence and with current health consequences. She does need aggressive weight loss plan due to Need for Hernia Repair- increased risk for recurrence without weight loss.  Jessica Irwin eats a high carb diet and tends to snack/graze throughout day, rarely sitting to eat a true meal.    Her problem is complicated by gender and short stature    Her ability to lose weight is impacted by misinformation and strongly held beliefs about food and lack of education on nutrition and dietary needs.    PLAN:    Keep food diary for 1 weeks  Dietician visit of education    Aggressive  Ancillary testing:  N/A.  Food Plan:  Low carbohydrate but not Keto.   Activity Plan:  Increase as able.  Supplementary: dietitian  Medication:  The patient will begin medication in pursuit of improved medical status as influenced by body weight. She will start Victoza.  There is a mutual understanding of the goals and risks of this therapy. The patient is in agreement. She is educated on dosage regimen and " "possible side effects.      No orders of the defined types were placed in this encounter.      She has the following co-morbidities:       8/27/2020   I have the following health issues associated with obesity: Osteoarthritis (joint disease)   I have the following symptoms associated with obesity: None of the above       Patient Goals 8/27/2020   I am interested in having a healthier weight to diminish current health problems: Yes   I am interested in having a healthier weight in order to prevent future health problems: Yes   I am interested in having a healthier weight in order to have a future surgery: Yes   If yes, please indicate which surgery? hernia repair     Hernia revision done 2 years ago after significant infection from mesh, wound vac x 16 months. Now Healed. Was losing a lot of weight quickly in the beginning (70lbs). Has regained 30 of those lbs.     Ventral hernia needs repair now. Dr. Roblero would like her to lose at least 20lbs. Did quit smoking so surgeon is moving ahead with the plan.     Referring Provider 8/27/2020   Please name the provider who referred you to Medical Weight Management.  If you do not know, please answer: \"I Don't Know\". Dr. Roblero       Weight History 8/27/2020   How concerned are you about your weight? Very Concerned   Would you describe your weight gain as gradual? No   I became overweight: As a Child   The following factors have contributed to my weight gain:  Lack of Exercise   I have tried the following methods to lose weight: Watching Portions or Calories, Exercise, Weight Watchers, Atkins-type Diet (Low Carb/High Protein), Constance Jaret, Nutrisystem, Slim Fast or Other Liquid Diets, Meal Replacements   My lowest weight since age 18 was: 170   My highest weight since age 18 was: 245   The most weight I have ever lost was: (lbs) 100   I have the following family history of obesity/being overweight:  One or more of my siblings are overweight   Has anyone in your family had " weight loss surgery? Yes   How has your weight changed over the last year?  Lost     Prior to 2 years ago, she was an insulin dependent diabetic. Doesn't want to be diabetic so she keeps diet very low carb- A1C last 5.8- Primary care provider Dr. Navas.     Diet Recall Review with Patient 8/27/2020   Do you typically eat breakfast? Yes   If you do eat breakfast, what types of food do you eat? organic seeded bread   Do you typically eat lunch? Yes   Do you typically eat supper? Yes   If you do eat supper, what types of food do you typically eat? chicken or pork, fresh brat on the grill sometimes, rarely beef   Do you typically eat snacks? Yes   If you do snack, what types of food do you typically eat? dried fruit, chickpea snacks, nuts, sugar free canned fruit, ice water   Do you like vegetables?  Yes   Do you drink water? Yes   How many glasses of juice do you drink in a typical day? 0   How many of glasses of milk do you drink in a typical day? 0   How many 8oz glasses of sugar containing drinks such as Matthew-Aid/sweet tea do you drink in a day? 0   How many cans/bottles of sugar pop/soda/tea/sports drinks do you drink in a day? 0   How many cans/bottles of diet pop/soda/tea or sports drink do you drink in a day? 0   How often do you have a drink of alcohol? Monthly or Less   If you do drink, how many drinks might you have in a day? 1 or 2   0900 wakes up   1000 Breakfast- organic low carb bread 1 slice with deli meat OR breakfast cake (carrots grated, eggs, oatmeal, spices)  Snacks on fruit and veggies throughout the day   Dinner - sweet potato hashbrowns with meat and veggies   Snacks- keto cheese balls, dried beans, sugar free jello/ pudding, sugar free cool whip     Eating Habits 8/27/2020   Generally, my meals include foods like these: bread, pasta, rice, potatoes, corn, crackers, sweet dessert, pop, or juice. A Few Times a Week   Generally, my meals include foods like these: fried meats, brats, burgers,  french fries, pizza, cheese, chips, or ice cream. A Few Times a Week   Eat fast food (like McDonalds, [employer], [employer]). Never   Eat at a buffet or sit-down restaurant. Never   Eat most of my meals in front of the TV or computer. Never   Often skip meals, eat at random times, have no regular eating times. Never   Rarely sit down for a meal but snack or graze throughout.  Never   Eat extra snacks between meals. Less Than Weekly   Eat most of my food at the end of the day. Less Than Weekly   Eat in the middle of the night or wake up at night to eat. Never   Eat extra snacks to prevent or correct low blood sugar. Never   Eat to prevent acid reflux or stomach pain. Never   Worry about not having enough food to eat. Never   Have you been to the food shelf at least a few times this year? No   I eat when I am depressed. Less Than Weekly   I eat when I am stressed. Less Than Weekly   I eat when I am bored. Less Than Weekly   I eat when I am anxious. Less Than Weekly   I eat when I am happy or as a reward. Less Than Weekly   I feel hungry all the time even if I just have eaten. Never   Feeling full is important to me. Never   I finish all the food on my plate even if I am already full. Never   I can't resist eating delicious food or walk past the good food/smell. Less Than Weekly   I eat/snack without noticing that I am eating. Once a Week   I eat when I am preparing the meal. Less Than Weekly   I eat more than usual when I see others eating. Once a Week   I have trouble not eating sweets, ice cream, cookies, or chips if they are around the house. Never   I think about food all day. Less Than Weekly   Please list any other foods you crave? cold things, cold fruit etc.       Amount of Food 8/27/2020   I make myself vomit what I have eaten or use laxatives to get rid of food. Never   I eat a large amount of food, like a loaf of bread, a box of cookies, a pint/quart of ice cream, all at once. Never   I eat a large amount  of food even when I am not hungry. Never   I eat rapidly. Monthly   I eat alone because I feel embarrassed and do not want others to see how much I have eaten. Never   I eat until I am uncomfortably full. Never   I feel bad, disgusted, or guilty after I overeat. Monthly   I make myself vomit what I have eaten or use laxatives to get rid of food. Never       Activity/Exercise History 8/27/2020   How much of a typical 12 hour day do you spend sitting? Less Than Half the Day   How much of a typical 12 hour day do you spend lying down? Less Than Half the Day   How much of a typical day do you spend walking/standing? Most of the Day   How many hours (not including work) do you spend on the TV/Video Games/Computer/Tablet/Phone? 2-3 Hours   How many times a week are you active for the purpose of exercise? 6-7 Times a Week   How many total minutes do you spend doing some activity for the purpose of exercising when you exercise? More Than 30 Minutes       PAST MEDICAL HISTORY:  No past medical history on file.    Work/Social History Reviewed With Patient 8/27/2020   My employment status is: Unemployed   What is your marital status? /In a Relationship   If in a relationship, is your significant other overweight? Yes   Do you have children? Yes   If you have children, are they overweight? No   Who do you live with?     Are they supportive of your health goals? Yes   Who does the food shopping?  together       Mental Health History Reviewed With Patient 8/27/2020   Have you ever been physically or sexually abused? No   If yes, do you feel that the abuse is affecting your weight? No   If yes, would you like to talk to a counselor about the abuse? No   How often in the past 2 weeks have you felt little interest or pleasure in doing things? For Several Days   Over the past 2 weeks how often have you felt down, depressed, or hopeless? For Several Days       Sleep History Reviewed With Patient 8/27/2020   How many  "hours do you sleep at night? 9   Do you think that you snore loudly or has anybody ever heard you snore loudly (louder than talking or so loud it can be heard behind a shut door)? No   Has anyone seen or heard you stop breathing during your sleep? No   Do you often feel tired, fatigued, or sleepy during the day? No       MEDICATIONS:   Current Outpatient Medications   Medication Sig Dispense Refill     citalopram (CELEXA) 20 MG tablet Take 20 mg by mouth       Lactobacillus (ACIDOPHILUS) CAPS Take 1 capsule by mouth       latanoprost (XALATAN) 0.005 % ophthalmic solution INSTILL 1 DROP INTO EACH EYE AT BEDTIME       Turmeric 500 MG CAPS Take 500 mg by mouth       vitamin B-12 (CYANOCOBALAMIN) 100 MCG tablet Take 100 mcg by mouth daily         ALLERGIES:   Allergies   Allergen Reactions     Clarithromycin Hives and Itching     Hydrocodone-Acetaminophen Nausea and Vomiting     Morphine Nausea and Vomiting, Nausea and GI Disturbance     vomiting       Oxycodone-Acetaminophen Hives, Nausea and Vomiting and Other (See Comments)     vomiting       Propoxyphene N-Apap Hives, Nausea and Vomiting and Other (See Comments)     vomiting       Droperidol Anxiety and Itching     Other reaction(s): Confusion, Diaphoresis, Other (see comments)  Fight or floght  \"fight or flight mode\"         PHYSICAL EXAM:  patient reports central obesity    FOLLOW-UP:   8 weeks.    TIME: 30 min spent on evaluation, management, counseling, education, & motivational interviewing with greater than 50 % of the total time was spent on counseling and coordinating care    Sincerely,    Jess Morillo NP        Jessica Irwin is a 59 year old female who is being evaluated via a billable telephone visit.      The patient has been notified of following:     \"This telephone visit will be conducted via a call between you and your physician/provider. We have found that certain health care needs can be provided without the need for a physical exam.  This service " "lets us provide the care you need with a short phone conversation.  If a prescription is necessary we can send it directly to your pharmacy.  If lab work is needed we can place an order for that and you can then stop by our lab to have the test done at a later time.    Telephone visits are billed at different rates depending on your insurance coverage. During this emergency period, for some insurers they may be billed the same as an in-person visit.  Please reach out to your insurance provider with any questions.    If during the course of the call the physician/provider feels a telephone visit is not appropriate, you will not be charged for this service.\"    Patient has given verbal consent for Telephone visit?  Yes    What phone number would you like to be contacted at? [phone]    How would you like to obtain your AVS? [place]    Phone call duration: 31 minutes    Jess Morillo NP  During this virtual visit the patient is located in [place], patient verifies this as the location during the entirety of this visit.   "

## 2020-08-28 ENCOUNTER — TELEPHONE (OUTPATIENT)
Dept: SURGERY | Facility: CLINIC | Age: 60
End: 2020-08-28

## 2020-08-28 NOTE — TELEPHONE ENCOUNTER
PRIOR AUTHORIZATION DENIED    Medication: Victoza - DENIED    Denial Date: 8/28/2020    Denial Rational: INS PREFER - OZEMPIC,TRULICITY    Appeal Information: SEE BELOW

## 2020-08-28 NOTE — TELEPHONE ENCOUNTER
"Prior Authorization Retail Medication Request    Medication/Dose: Victoza  ICD code (if different than what is on RX):  Type 2 diabetes mellitus without complication, without long-term current use of insulin (H) [E11.9]  - Primary     Previously Tried and Failed:  Watching Portions or Calories, Exercise, Weight Watchers, Atkins-type Diet (Low Carb/High Protein), Constance Jaret, Nutrisystem, Slim Fast or Other Liquid Diets, Meal Replacements  Rationale:  Jessica Irwin is a 59 year old female interested in treatment of medical problems associated with excess weight. She has a height of 5' 3\", a weight of 224 lbs 0 oz, and the calculated Body mass index is 39.68 kg/m . She does need aggressive weight loss plan due to Need for Hernia Repair- increased risk for recurrence without weight loss.  Jessica Irwin eats a high carb diet and tends to snack/graze throughout day, rarely sitting to eat a true meal.     Her problem is complicated by gender and short stature     Her ability to lose weight is impacted by misinformation and strongly held beliefs about food and lack of education on nutrition and dietary needs.    Prior to 2 years ago, she was an insulin dependent diabetic. Doesn't want to be diabetic so she keeps diet very low carb- A1C last 5.8    Insurance Name:    Insurance ID:        Pharmacy Information (if different than what is on RX)  Name:  [facility] - [place] - [address]   Phone:  [phone]  "

## 2020-08-31 ENCOUNTER — TELEPHONE (OUTPATIENT)
Dept: SURGERY | Facility: CLINIC | Age: 60
End: 2020-08-31

## 2020-08-31 ENCOUNTER — TELEPHONE (OUTPATIENT)
Dept: ENDOCRINOLOGY | Facility: CLINIC | Age: 60
End: 2020-08-31

## 2020-08-31 DIAGNOSIS — E11.9 TYPE 2 DIABETES MELLITUS WITHOUT COMPLICATION, WITHOUT LONG-TERM CURRENT USE OF INSULIN (H): Primary | ICD-10-CM

## 2020-08-31 RX ORDER — SEMAGLUTIDE 1.34 MG/ML
INJECTION, SOLUTION SUBCUTANEOUS
Qty: 2 PEN | Refills: 1 | Status: SHIPPED | OUTPATIENT
Start: 2020-08-31 | End: 2021-01-19

## 2020-08-31 NOTE — TELEPHONE ENCOUNTER
[facility] Call Center    Phone Message    May a detailed message be left on voicemail: yes     Reason for Call: Requesting Results   Name/type of test: Nicotine and Continine Urine test   Date of test: Unknown Pt states around 08/14  Was test done at a location other than [facility] (Please fill in the location if not [facility])?: Yes: [facility]       Action Taken: Message routed to:  [facility] General Surgery    Travel Screening: Not Applicable

## 2020-08-31 NOTE — TELEPHONE ENCOUNTER
Spoke with patient regarding her nicotine test and informed her of her negative results. She was very happy to hear this. Discussed Dr. Roblero's plans- continuing to work on weight loss while her abdominal wound heals. She should return in 6 months for follow-up with Dr. Roblero. She said her wound has healed. She will continue to monitor her wound and work on weight loss. She is aware she will be contacted to help arrange an appointment once the schedule is available.

## 2020-08-31 NOTE — TELEPHONE ENCOUNTER
"Prior Authorization Retail Medication Request    Medication/Dose: Ozempic  ICD code (if different than what is on RX):  Type 2 diabetes mellitus without complication, without long-term current use of insulin (H) [E11.9]  - Primary     Previously Tried and Failed:  Watching Portions or Calories, Exercise, Weight Watchers, Atkins-type Diet (Low Carb/High Protein), Constance Jaret, Nutrisystem, Slim Fast or Other Liquid Diets, Meal Replacements  Rationale:  Jessica Irwin is a 59 year old female interested in treatment of medical problems associated with excess weight. She has a height of 5' 3\", a weight of 224 lbs 0 oz, and the calculated Body mass index is 39.68 kg/m . She does need aggressive weight loss plan due to Need for Hernia Repair- increased risk for recurrence without weight loss.  Jessica Irwin eats a high carb diet and tends to snack/graze throughout day, rarely sitting to eat a true meal.     Her problem is complicated by gender and short stature     Her ability to lose weight is impacted by misinformation and strongly held beliefs about food and lack of education on nutrition and dietary needs.     Prior to 2 years ago, she was an insulin dependent diabetic. Doesn't want to be diabetic so she keeps diet very low carb- A1C last 5.8    Insurance Name:    Insurance ID:        Pharmacy Information (if different than what is on RX)  Name:  [facility] - [place] - [address]  Phone:  [phone]  "

## 2020-08-31 NOTE — TELEPHONE ENCOUNTER
PA for Victoza denied. Insurance prefers Ozempic or Trulicity. Per Jess Morillo, CNP will send rx for Ozempic.

## 2020-08-31 NOTE — TELEPHONE ENCOUNTER
Prior Authorization Not Needed per Insurance    Medication: Semaglutide,0.25 or 0.5MG/DOS, (OZEMPIC, 0.25 OR 0.5 MG/DOSE,) 2 MG/1.5ML SOPN - PA Not Needed  Insurance Company: [employer] - Phone [phone] Fax [phone]  Expected CoPay:      Pharmacy Filling the Rx: [facility] [address] [place] [address]  Pharmacy Notified: Yes  Patient Notified: No    Confirmed with pharmacy. Claim processed with no co-pay. Patient is set up to get notified to  medication when ready.

## 2020-10-23 ENCOUNTER — TELEPHONE (OUTPATIENT)
Dept: ENDOCRINOLOGY | Facility: CLINIC | Age: 60
End: 2020-10-23

## 2020-10-23 NOTE — TELEPHONE ENCOUNTER
Reason for call:  Other   Patient called regarding (reason for call): call back  Additional comments: Patient would like a call back in regards to Ozempic medication questions.     Phone number to reach patient:  Home number on file [phone] (home)    Best Time:  Anytime     Can we leave a detailed message on this number?  YES    Travel screening: Not Applicable

## 2020-10-26 ENCOUNTER — TELEPHONE (OUTPATIENT)
Dept: ENDOCRINOLOGY | Facility: CLINIC | Age: 60
End: 2020-10-26

## 2020-10-26 NOTE — TELEPHONE ENCOUNTER
Left message for patient to call the office regarding questions about Ozempic.  Patient does not have MyChart.

## 2020-10-27 NOTE — TELEPHONE ENCOUNTER
Patient calling to give update on Ozempic prescription.    Patient states she is doing well on the Ozempic.  She is eating less. States she has more energy.  Thinks she has lost 8-10 pounds. Had nausea for three days after initial dose but has not had any since.

## 2020-12-09 NOTE — TELEPHONE ENCOUNTER
Refill request from pharmacy for Ozempic. Refill denied at this time. Patient needs follow up appointment with Jess Morillo CNP. Message sent to clinic coordinators to schedule virtual visit.

## 2020-12-15 ENCOUNTER — TELEPHONE (OUTPATIENT)
Dept: ENDOCRINOLOGY | Facility: CLINIC | Age: 60
End: 2020-12-15

## 2020-12-17 ENCOUNTER — VIRTUAL VISIT (OUTPATIENT)
Dept: ENDOCRINOLOGY | Facility: CLINIC | Age: 60
End: 2020-12-17
Payer: COMMERCIAL

## 2020-12-17 VITALS — HEIGHT: 64 IN | BODY MASS INDEX: 37.56 KG/M2 | WEIGHT: 220 LBS

## 2020-12-17 DIAGNOSIS — E66.812 OBESITY, CLASS II, BMI 35-39.9: Primary | ICD-10-CM

## 2020-12-17 DIAGNOSIS — E11.9 TYPE 2 DIABETES MELLITUS WITHOUT COMPLICATION, WITHOUT LONG-TERM CURRENT USE OF INSULIN (H): ICD-10-CM

## 2020-12-17 PROCEDURE — 99214 OFFICE O/P EST MOD 30 MIN: CPT | Mod: 95 | Performed by: NURSE PRACTITIONER

## 2020-12-17 ASSESSMENT — MIFFLIN-ST. JEOR: SCORE: 1552.91

## 2020-12-17 ASSESSMENT — PAIN SCALES - GENERAL: PAINLEVEL: NO PAIN (0)

## 2020-12-17 NOTE — PATIENT INSTRUCTIONS
"Thank you for allowing us the privilege of caring for you. We hope we provided you with the excellent service you deserve.   Please let us know if there is anything else we can do for you so that we can be sure you are completely satisfied with your care experience.    To ensure the quality of our services you may be receiving a patient satisfaction survey from an independent patient satisfaction monitoring company.    The greatest compliment you can give is a \"Likely to Recommend\"    Your visit was with Jess Morillo NP today.    Instructions per today's visit:     Ayaz Irwin, it was great to visit with you today.  Here is a review of our visit.  If our clinic scheduler is not able to reach you please call [phone] to schedule your next appointments.    -food journal x 3 days   -schedule with dietitian   -we will hold off refilling the ozempic for now  I'll try to find records from your surgeries   -follow up in 1 month         Information about Video Visits with [facility] [employer]: video visit information    Please call our contact center at [phone] to schedule your next appointments.  To find a lab location near you, please call [phone].  For any nursing questions or concerns call Shereen Gauthier LPN at [phone] or Lizzie Valle RN at [phone]  Please call during clinic hours Monday through Friday 8:00a - 4:00p if you have questions or you can contact us via Viddseet at anytime and we will reply during clinic hours.    Lab results will be communicated through My Chart or letter (if My Chart not used). Please call the clinic if you have not received communication after 1 week or if you have any questions.?  Clinic Fax: [phone]  ______________________________________________________________________________________________________________________  Meal Replacement Products:    Here is the link to our new e-store where you can purchase our meal replacement products    [facility] " EColor PromosStore  [employer]/store    The one week starter kit is a great way to sample a variety of products and see what works for you.    If you want more information about the product go to: [employer]    If you are an employee or [facility] or [facility] please contact your care team for a 10% estore discount    Free Shipping for orders over $75     Benefits of meal replacements products:    Portion and calorie control  Improved nutrition  Structured eating  Simplified food choices  Avoid contact with trigger foods  _________________________________________________________________________________________________________________________________  Interested in working with a health ?  Health coaches work with you to improve your overall health and wellbeing.  They look at the whole person, and may involve discussion of different areas of life, including, but not limited to the four pillars of health (sleep, exercise, nutrition, and stress management). Discuss with your care team if you would like to start working a health .  Health Coaching-3 Pack: Schedule by calling [phone]    $99 for three health coaching visits    Visits may be done in person or via phone    Coaching is a partnership between the  and the client; Coaches do not prescribe or diagnose    Coaching helps inspire the client to reach his/her personal goals   _________________________________________________________________________________________________________________________________  SUPPORT GROUPS    [facility] Weight Loss Surgery Support Group    [facility] Weight Loss Surgery Support Group    The [facility] Weight Loss Surgery Support Group is a patient-lead forum that meets monthly. Due to Covid-19, we are meeting remotely from 5 PM - 6 PM via Microsoft Teams. The group is facilitated by Radha Holly, the sara s  Clinical Coordinator. The support group shares experiences, encouragement and education. It is open to those who have had weight loss surgery, are scheduled for surgery, and those who are considering surgery.   If you are interested in attending, please contact the clinic via Max[employer]te or call the nurse line directly at [phone] to inform our staff that you would like an invite sent to you. Please let us know the email you would like the invite sent to. Prior to the meeting, a link with directions on how to join the meeting will be sent to you.    2020 Meetings December 2 - The group will not have a speaker. The focus will be to offer support to one another during the Holiday season.    2021 Meetings January 20 - Guest Speaker: James Cuevas RD, LD     February 17 - The group will not have a speaker. This will be a time of group support.     March 17 - Guest Speaker: Dianelys Childs, [facility], [facility], [facility]     [facility] Support Groups    Connections: Bariatric Care Support Group?  This group takes place the second Tuesday of each month from 6:30 PM - 8 PM virtually using Microsoft Teams. It is led by Faustino Soto, Ph.D who is a Licensed Psychologist with the [facility] Comprehensive Weight Management Program. There is no cost for group participation and it is open to all [facility] (and those external to this program) pre- and post- operative bariatric surgery patients as well as their support system.   Please send an email to Faustino Soto, Ph.D., LP [email or web address]?if you would like an invitation to the group and to learn about using Microsoft Teams.    2020 Meetings     November 10 - Healthy Eating  Guest Speaker: Mirian Meeks RD, LD     December 8    Connections: Post-Operative Bariatric Surgery Support Group  This support group meets the 4th Wednesday of the month from 11 AM - 12 PM virtually using Microsoft Teams. It is led  by Miriam Alexandre RN. This is a support group for [facility] bariatric patients (and those external to [facility]) who have had bariatric surgery and are at least 3 months post-surgery. There is no cost to attend and registration is not required. Please send an email to Miriam Alexandre RN at [email or web address] if you would like an invitation to the group and to learn about using Microsoft Teams.    2020 Meetings November 25 December 23    [facility] Healthy Lifestyle Virtual Support Group    Healthy Lifestyle Virtual Support Group?    This group is held monthly on a Friday on the fourth Friday from 12:30 PM - to 1:30 PM. It is 60 minutes of small group guided discussion, support and resources led by National Board Certified Health , Miriam Hamm. All are welcome who want a healthy lifestyle. To receive monthly invites to the Healthy Lifestyle Virtual Support Group or if you have any questions about having a health  or attending support group, please email Miriam [email or web address]. Miriam will send out invites for each session, with the phone number and the conference ID number specific to that session.    2020 Meetings November 13 - The Importance of Self Care and Connection During Covid    December 18 - Open Forum    2021 Meetings January 29 - How to Stay Active and Healthy during the Winter Months    February 26 - Reading Food Labels: What do I Need to Know?  Guest Speaker: Priscilla Stinson RD    March 19 - Finding Health, Happiness and Confidence at Every Size  Guest Speaker: Jenniffer Becker, Health Psychology Fellow    April 30 - Healthy Eating on a Budget  Guest Speaker: Rebeca Chávez RD    May 21 - Open Forum  _____________________________________________________________________________________________________________________________  [facility] Get Moving Program  Our team of physical therapists is trained to  help you understand and take control of your condition. They will perform a thorough evaluation to determine your ability for activity and develop a customized plan to fit your goals and physical ability.  Scheduling: Unsure if the Get Moving program is right for you? Discuss the program with your medical provider or diabetes educator. You can also call us at [phone] to ask questions or schedule an appointment.   [facility] Get Moving Program  ______________________________________________________________________________________________________________________  [facility] Diabetes Prevention Program (DPP)  If you have prediabetes and Medicare please contact us by MyChart or phone to learn more about our Diabetes Prevention Program  _______________________________________________________________________________________________________________________  Bluetooth Scale:    We hope to provide you with high quality telephone and virtual healthcare visits while social distancing for COVID-19 is necessary, as well as in the future when virtual visits may be more convenient for you.     Our technology team made it possible for Bluetooth scales to send weight measurements to our electronic medical record. This allows weights from you weighing at home to securely flow into the medical record, which will improve telephone and virtual visits.   Additionally, studies have shown that adults actually lose more weight when their weights are automatically sent to someone else, and also that this process is not stressful for those adults.    Below is a link for purchasing the scale, with a discount code for our patients. You may call your insurance company to see if they will reimburse you for the cost of the scale, as a piece of durable medical equipment. The scales only go up to a weight of 400 pounds. This is an issue and we are working with the developer on increasing this. We found no scales that go over 400lb that have  blue-tooth for connecting to Smart [employer]inations.    Scale to purchase: the Experiment  Body  Scale: https://[email or web address].bizHive/us/en/body/shop?[email or web address]&gclsrc=aw.ds    Discount Code: We have a discount code for our patients to bring the cost down to $50, Discount code is: [place]_Scale_20%off  Thank you,   [facility] Comprehensive Weight Management Team

## 2020-12-17 NOTE — LETTER
"2020       RE: Jessica Irwin  [address] [facility] [address] [facility] [address]     Dear Colleague,    Thank you for referring your patient, Jessica Irwin, to the [facility]NAG[facility] [place] at [facility]. Please see a copy of my visit note below.        Return Medical Weight Management Note     Jessica Irwin  MRN:  [id number]  :  1960  HUSSEIN:  2020    Dear Carson Navas MD,    I had the pleasure of seeing your patient Jessica Irwin. She is a 60 year old female who I am continuing to see for treatment of obesity related to:       2020   I have the following health issues associated with obesity: Osteoarthritis (joint disease)   I have the following symptoms associated with obesity: None of the above       INTERVAL HISTORY:  New [facility] 2020 - needing weight loss for hernia repair. Started ozmempic (victoza not covered). Also discussed food journal and decreasing carbohydrates and increasing activity as able.  Follow up with RN since then with good tolerance and results 1 month after starting.     Today:   Patient experiencing earlier satiety and decreased hunger, actually not wanting to eat anything. She feels like this has been her baseline since her \"abdominal surgery\" back in 2018 at [place]. She notes symptoms are worse on ozempic 0.25mg once weekly. She was due for injection yesterday but had ran out of medication.     Prior to this surgery, she was on insulin to manage her blood sugars. She was \"really sick\" and lost a lot of weight afterwards. Since, she had just been managed with diet.     Baseline since surgery - Urgent loose BM 15min after every meal, worse with carbohydrates and sugar. \"Low blood sugars\" which she was experiencing only occasionally - would have symptoms at 90 and then would \"decrease quickly from there\".     Since starting ozempic, her lows have been daily, requiring a glucose tablet. Symptomatic at 90. " "Has dropped as low as 50. Recovers quickly with glucose tab.     Patient recalls she was told to increase her protein- immediately after surgery- but didn't have follow up about this after so she isn't sure what that means. She isn't sure if she had bowel removed during this procedure. Records for this surgery unable to be viewed today. Will contact records to try to obtain this to better understand her anatomy. S/p brennan as a teenager.       CURRENT WEIGHT:   220 lbs 0 oz    Initial Weight (lbs): 224 lbs  Last Visits Weight: 101.6 kg (224 lb)  Cumulative weight loss (lbs): 4  Weight Loss Percentage: 1.79%    Changes and Difficulties 12/17/2020   I have made the following changes to my diet since my last visit: not eating, not hungry   With regards to my diet, I am still struggling with: nothing   I have made the following changes to my activity/exercise since my last visit: none   With regards to my activity/exercise, I am still struggling with: nothing       VITALS:  Ht 1.626 m (5' 4\")   Wt 99.8 kg (220 lb)   BMI 37.76 kg/m      MEDICATIONS:   Current Outpatient Medications   Medication Sig Dispense Refill     citalopram (CELEXA) 20 MG tablet Take 20 mg by mouth       insulin pen needle (B-D U/F) 31G X 8 MM miscellaneous Use 1 pen needles daily or as directed. 100 each 1     Lactobacillus (ACIDOPHILUS) CAPS Take 1 capsule by mouth       latanoprost (XALATAN) 0.005 % ophthalmic solution INSTILL 1 DROP INTO EACH EYE AT BEDTIME       Semaglutide,0.25 or 0.5MG/DOS, (OZEMPIC, 0.25 OR 0.5 MG/DOSE,) 2 MG/1.5ML SOPN For the first 4 weeks inject 0.25mg weekly. Then increase to0.5mg weekly thereafter. 2 pen 1     Turmeric 500 MG CAPS Take 500 mg by mouth       vitamin B-12 (CYANOCOBALAMIN) 100 MCG tablet Take 100 mcg by mouth daily         Weight Loss Medication History Reviewed With Patient 12/17/2020   Which weight loss medications are you currently taking on a regular basis?  Ozempic   Are you having any side effects " "from the weight loss medication that we have prescribed you? Yes   If you are having side effects please describe: lows daily, testing regularly       ASSESSMENT:   Weight loss x 10lbs initially and has regained 6lbs. Increased frequency of perceived hypoglycemia with ozempic. Increased fullness and satiety on 0.25mg ozempic but this is bothersome to the patient especially since she isn't seeing a lot of weight loss. Has not adjusted diet, seen dietitian, practiced journaling since last seen.     PLAN:   Hold Ozempic for 1 month  Food journal x 3 days   See dietitian - discussed increasing protein to help manage perceived hypoglycemia  Obtain surgical records from [place] - question malabsorption component causing diarrhea and low blood sugars?         FOLLOW-UP:    4 weeks.    Time: 23 min spent on evaluation, management, counseling, education, & motivational interviewing with greater than 50 % of the total time was spent on counseling and coordinating care    Sincerely,    Jess Morillo NP    Jessica Irwin is a 60 year old female who is being evaluated via a billable telephone visit.      The patient has been notified of following:     \"This telephone visit will be conducted via a call between you and your physician/provider. We have found that certain health care needs can be provided without the need for a physical exam.  This service lets us provide the care you need with a short phone conversation.  If a prescription is necessary we can send it directly to your pharmacy.  If lab work is needed we can place an order for that and you can then stop by our lab to have the test done at a later time.    Telephone visits are billed at different rates depending on your insurance coverage. During this emergency period, for some insurers they may be billed the same as an in-person visit.  Please reach out to your insurance provider with any questions.    If during the course of the call the physician/provider feels a " "telephone visit is not appropriate, you will not be charged for this service.\"    Patient has given verbal consent for Telephone visit?  Yes    What phone number would you like to be contacted at? [phone]    How would you like to obtain your AVS? Mail a copy-patient declined copy    Phone call duration: 23 minutes    Jess Morillo NP    During this virtual visit the patient is located in [place], patient verifies this as the location during the entirety of this visit.         "

## 2020-12-17 NOTE — PROGRESS NOTES
"    Return Medical Weight Management Note     Jessica Irwin  MRN:  [id number]  :  1960  HUSSEIN:  2020    Dear Carson Navas MD,    I had the pleasure of seeing your patient Jessica Irwin. She is a 60 year old female who I am continuing to see for treatment of obesity related to:       2020   I have the following health issues associated with obesity: Osteoarthritis (joint disease)   I have the following symptoms associated with obesity: None of the above       INTERVAL HISTORY:  New MWM 2020 - needing weight loss for hernia repair. Started ozmempic (victoza not covered). Also discussed food journal and decreasing carbohydrates and increasing activity as able.  Follow up with RN since then with good tolerance and results 1 month after starting.     Today:   Patient experiencing earlier satiety and decreased hunger, actually not wanting to eat anything. She feels like this has been her baseline since her \"abdominal surgery\" back in 2018 at [facility]. She notes symptoms are worse on ozempic 0.25mg once weekly. She was due for injection yesterday but had ran out of medication.     Prior to this surgery, she was on insulin to manage her blood sugars. She was \"really sick\" and lost a lot of weight afterwards. Since, she had just been managed with diet.     Baseline since surgery - Urgent loose BM 15min after every meal, worse with carbohydrates and sugar. \"Low blood sugars\" which she was experiencing only occasionally - would have symptoms at 90 and then would \"decrease quickly from there\".     Since starting ozempic, her lows have been daily, requiring a glucose tablet. Symptomatic at 90. Has dropped as low as 50. Recovers quickly with glucose tab.     Patient recalls she was told to increase her protein- immediately after surgery- but didn't have follow up about this after so she isn't sure what that means. She isn't sure if she had bowel removed during this procedure. Records for this surgery " "unable to be viewed today. Will contact records to try to obtain this to better understand her anatomy. S/p brennan as a teenager.       CURRENT WEIGHT:   220 lbs 0 oz    Initial Weight (lbs): 224 lbs  Last Visits Weight: 101.6 kg (224 lb)  Cumulative weight loss (lbs): 4  Weight Loss Percentage: 1.79%    Changes and Difficulties 12/17/2020   I have made the following changes to my diet since my last visit: not eating, not hungry   With regards to my diet, I am still struggling with: nothing   I have made the following changes to my activity/exercise since my last visit: none   With regards to my activity/exercise, I am still struggling with: nothing       VITALS:  Ht 1.626 m (5' 4\")   Wt 99.8 kg (220 lb)   BMI 37.76 kg/m      MEDICATIONS:   Current Outpatient Medications   Medication Sig Dispense Refill     citalopram (CELEXA) 20 MG tablet Take 20 mg by mouth       insulin pen needle (B-D U/F) 31G X 8 MM miscellaneous Use 1 pen needles daily or as directed. 100 each 1     Lactobacillus (ACIDOPHILUS) CAPS Take 1 capsule by mouth       latanoprost (XALATAN) 0.005 % ophthalmic solution INSTILL 1 DROP INTO EACH EYE AT BEDTIME       Semaglutide,0.25 or 0.5MG/DOS, (OZEMPIC, 0.25 OR 0.5 MG/DOSE,) 2 MG/1.5ML SOPN For the first 4 weeks inject 0.25mg weekly. Then increase to0.5mg weekly thereafter. 2 pen 1     Turmeric 500 MG CAPS Take 500 mg by mouth       vitamin B-12 (CYANOCOBALAMIN) 100 MCG tablet Take 100 mcg by mouth daily         Weight Loss Medication History Reviewed With Patient 12/17/2020   Which weight loss medications are you currently taking on a regular basis?  Ozempic   Are you having any side effects from the weight loss medication that we have prescribed you? Yes   If you are having side effects please describe: lows daily, testing regularly       ASSESSMENT:   Weight loss x 10lbs initially and has regained 6lbs. Increased frequency of perceived hypoglycemia with ozempic. Increased fullness and satiety on " 0.25mg ozempic but this is bothersome to the patient especially since she isn't seeing a lot of weight loss. Has not adjusted diet, seen dietitian, practiced journaling since last seen.     PLAN:   Hold Ozempic for 1 month  Food journal x 3 days   See dietitian - discussed increasing protein to help manage perceived hypoglycemia  Obtain surgical records from [facility] - question malabsorption component causing diarrhea and low blood sugars?         FOLLOW-UP:    4 weeks.    Time: 23 min spent on evaluation, management, counseling, education, & motivational interviewing with greater than 50 % of the total time was spent on counseling and coordinating care    Sincerely,    Jess Morillo NP

## 2020-12-17 NOTE — Clinical Note
I am looking for operative reports for patient's surgeries around 2018- hernia repairs, hysterectomy, bowel washouts etc. I believe they were at [facility]? Thanks!

## 2020-12-17 NOTE — NURSING NOTE
"Chief Complaint   Patient presents with     Follow Up     return [facility]       Vitals:    12/17/20 1230   Weight: 99.8 kg (220 lb)   Height: 1.626 m (5' 4\")       Body mass index is 37.76 kg/m .                           "

## 2020-12-17 NOTE — PROGRESS NOTES
"Jessica Irwin is a 60 year old female who is being evaluated via a billable telephone visit.      The patient has been notified of following:     \"This telephone visit will be conducted via a call between you and your physician/provider. We have found that certain health care needs can be provided without the need for a physical exam.  This service lets us provide the care you need with a short phone conversation.  If a prescription is necessary we can send it directly to your pharmacy.  If lab work is needed we can place an order for that and you can then stop by our lab to have the test done at a later time.    Telephone visits are billed at different rates depending on your insurance coverage. During this emergency period, for some insurers they may be billed the same as an in-person visit.  Please reach out to your insurance provider with any questions.    If during the course of the call the physician/provider feels a telephone visit is not appropriate, you will not be charged for this service.\"    Patient has given verbal consent for Telephone visit?  Yes    What phone number would you like to be contacted at? [phone]    How would you like to obtain your AVS? Mail a copy-patient declined copy    Phone call duration: 23 minutes    Jess Morillo NP    During this virtual visit the patient is located in [place], patient verifies this as the location during the entirety of this visit.     "

## 2020-12-24 ENCOUNTER — TELEPHONE (OUTPATIENT)
Dept: ENDOCRINOLOGY | Facility: CLINIC | Age: 60
End: 2020-12-24

## 2020-12-24 NOTE — TELEPHONE ENCOUNTER
LVM for patient.  Schedule 1 month follow-up (from 12/17) for med check with Jess Morillo.     Federico Manzanares

## 2021-01-12 ENCOUNTER — PATIENT OUTREACH (OUTPATIENT)
Dept: SURGERY | Facility: CLINIC | Age: 61
End: 2021-01-12

## 2021-01-12 NOTE — PROGRESS NOTES
Dr. Roblero would like to see this patient in February 2021 for a follow up visit (in-person) for evaluation of incisional hernia and weight check.    Attempted to reach patient with no answer. LM on  to call scheduling line to arrange IN person visit.  Scheduling number provided.

## 2021-01-13 ENCOUNTER — DOCUMENTATION ONLY (OUTPATIENT)
Dept: CARE COORDINATION | Facility: CLINIC | Age: 61
End: 2021-01-13

## 2021-01-19 ENCOUNTER — VIRTUAL VISIT (OUTPATIENT)
Dept: ENDOCRINOLOGY | Facility: CLINIC | Age: 61
End: 2021-01-19
Payer: COMMERCIAL

## 2021-01-19 VITALS — BODY MASS INDEX: 37.56 KG/M2 | WEIGHT: 220 LBS | HEIGHT: 64 IN

## 2021-01-19 DIAGNOSIS — E66.812 OBESITY, CLASS II, BMI 35-39.9: Primary | ICD-10-CM

## 2021-01-19 DIAGNOSIS — E11.9 TYPE 2 DIABETES MELLITUS WITHOUT COMPLICATION, WITHOUT LONG-TERM CURRENT USE OF INSULIN (H): ICD-10-CM

## 2021-01-19 PROCEDURE — 99213 OFFICE O/P EST LOW 20 MIN: CPT | Mod: 95 | Performed by: NURSE PRACTITIONER

## 2021-01-19 ASSESSMENT — MIFFLIN-ST. JEOR: SCORE: 1552.91

## 2021-01-19 ASSESSMENT — PAIN SCALES - GENERAL: PAINLEVEL: NO PAIN (0)

## 2021-01-19 NOTE — ASSESSMENT & PLAN NOTE
Patient struggles with weight loss since abdominal surgery and prolonged healing of abdominal wound- now healed. Has follow up with Dr. Roblero planned. Diarrhea with any PO intake which limites patient's PO intake. States this started after her abdominal surgery at [facility]- she will call to see if records can be forwarded to us. Question component of malabsorption?   Question if patient is getting adequate protein and caloric intake in to lose any weight. Recommend food journal x 1 week and follow up with RD to review PRIOR to discussing medications for weight loss.

## 2021-01-19 NOTE — LETTER
2021       RE: Jessica Irwin  [id number] [facility] [address]     Dear Colleague,    Thank you for referring your patient, Jessica Iriwn, to the [facility] [place] [facility]. Please see a copy of my visit note below.    Jessica is a 60 year old who is being evaluated via a billable telephone visit.      What phone number would you like to be contacted at? [phone]  How would you like to obtain your AVS? Mail a copy     During this virtual visit the patient is located in [place], patient verifies this as the location during the entirety of this visit.     Phone call duration: 16 minutes    Return Medical Weight Management Note     Jessica Irwin  MRN:  [id number]  :  1960  HUSSEIN:  2021    Dear Carson Navas MD,    I had the pleasure of seeing your patient Jessica Irwin. She is a 60 year old female who I am continuing to see for treatment of obesity related to:       2020   I have the following health issues associated with obesity: Osteoarthritis (joint disease)   I have the following symptoms associated with obesity: None of the above       Assessment & Plan   Problem List Items Addressed This Visit        Endocrine    Type 2 diabetes mellitus without complication, without long-term current use of insulin (H)     Attempted to use ozempic for weight loss. Patient had diarrhea but did notice some earlier satiety. Patient has high deductible and can no longer afford it.             Other    Obesity, Class II, BMI 35-39.9 - Primary     Patient struggles with weight loss since abdominal surgery and prolonged healing of abdominal wound- now healed. Has follow up with Dr. Roblero planned. Diarrhea with any PO intake which limites patient's PO intake. States this started after her abdominal surgery at [facility]- she will call to see if records can be forwarded to us. Question component of malabsorption?  "  Question if patient is getting adequate protein and caloric intake in to lose any weight. Recommend food journal x 1 week and follow up with RD to review PRIOR to discussing medications for weight loss.                 Review of external notes as documented above     20 minutes spent on the date of the encounter doing chart review, history and exam, documentation and further activities as noted above  0956}  MEDICATIONS:  Continue current medications without change, continue to hold ozempic   FUTURE APPOINTMENTS:       - Follow-up visit in 3 months       INTERVAL HISTORY:  Last seen 12/17/2020- stopped ozempic because she was having diarrhea. Had met out of pocket. Now, she is worried she wont get it covered without having met out of pocket.     Diet recall -   Toast in the morning with turkey   Skip lunch   Dinner - turkey asparagus and bull   No liquid calories   Occasional- hand full of nuts     CURRENT WEIGHT:   220 lbs 0 oz    Initial Weight (lbs): 224 lbs  Last Visits Weight: 99.8 kg (220 lb)  Cumulative weight loss (lbs): 4  Weight Loss Percentage: 1.79%    Changes and Difficulties 1/19/2021   I have made the following changes to my diet since my last visit: same   With regards to my diet, I am still struggling with: can't lose weight   I have made the following changes to my activity/exercise since my last visit: same   With regards to my activity/exercise, I am still struggling with: -       VITALS:  Ht 1.626 m (5' 4\")   Wt 99.8 kg (220 lb)   BMI 37.76 kg/m      MEDICATIONS:   Current Outpatient Medications   Medication Sig Dispense Refill     citalopram (CELEXA) 20 MG tablet Take 20 mg by mouth       Lactobacillus (ACIDOPHILUS) CAPS Take 1 capsule by mouth       latanoprost (XALATAN) 0.005 % ophthalmic solution INSTILL 1 DROP INTO EACH EYE AT BEDTIME       Turmeric 500 MG CAPS Take 500 mg by mouth       vitamin B-12 (CYANOCOBALAMIN) 100 MCG tablet Take 100 mcg by mouth daily         Weight Loss " "Medication History Reviewed With Patient 1/19/2021   Which weight loss medications are you currently taking on a regular basis?  None   If you are not taking a weight loss medication that was prescribed to you, please indicate why: Other   Are you having any side effects from the weight loss medication that we have prescribed you? -   If you are having side effects please describe: holding Ozempic for now. Will discuss today       No results found for any previous visit.       PHYSICAL EXAM:  Objective    Ht 1.626 m (5' 4\")   Wt 99.8 kg (220 lb)   BMI 37.76 kg/m    Vitals - Patient Reported  Pain Score: No Pain (0)        Physical Exam   healthy, alert and no distress  PSYCH: Alert and oriented times 3; coherent speech, normal   rate and volume, able to articulate logical thoughts, able   to abstract reason, no tangential thoughts, no hallucinations   or delusions  Her affect is normal  RESP: No cough, no audible wheezing, able to talk in full sentences  Remainder of exam unable to be completed due to telephone visits      Sincerely,    Jess Morillo NP      "

## 2021-01-19 NOTE — PROGRESS NOTES
Jessica is a 60 year old who is being evaluated via a billable telephone visit.      What phone number would you like to be contacted at? [phone]  How would you like to obtain your AVS? Mail a copy     During this virtual visit the patient is located in [place], patient verifies this as the location during the entirety of this visit.     Phone call duration: 16 minutes    Return Medical Weight Management Note     Jessica Irwin  MRN:  [id number]  :  1960  HUSSEIN:  2021    Dear Carson Navas MD,    I had the pleasure of seeing your patient Jessica Irwin. She is a 60 year old female who I am continuing to see for treatment of obesity related to:       2020   I have the following health issues associated with obesity: Osteoarthritis (joint disease)   I have the following symptoms associated with obesity: None of the above       Assessment & Plan   Problem List Items Addressed This Visit        Endocrine    Type 2 diabetes mellitus without complication, without long-term current use of insulin (H)     Attempted to use ozempic for weight loss. Patient had diarrhea but did notice some earlier satiety. Patient has high deductible and can no longer afford it.             Other    Obesity, Class II, BMI 35-39.9 - Primary     Patient struggles with weight loss since abdominal surgery and prolonged healing of abdominal wound- now healed. Has follow up with Dr. Roblero planned. Diarrhea with any PO intake which limites patient's PO intake. States this started after her abdominal surgery at [facility]- she will call to see if records can be forwarded to us. Question component of malabsorption?   Question if patient is getting adequate protein and caloric intake in to lose any weight. Recommend food journal x 1 week and follow up with RD to review PRIOR to discussing medications for weight loss.                 Review of external notes as documented above     20 minutes spent on the date of the encounter doing  "chart review, history and exam, documentation and further activities as noted above  0956}  MEDICATIONS:  Continue current medications without change, continue to hold ozempic   FUTURE APPOINTMENTS:       - Follow-up visit in 3 months       INTERVAL HISTORY:  Last seen 12/17/2020- stopped ozempic because she was having diarrhea. Had met out of pocket. Now, she is worried she wont get it covered without having met out of pocket.     Diet recall -   Toast in the morning with turkey   Skip lunch   Dinner - turkey asparagus and bull   No liquid calories   Occasional- hand full of nuts     CURRENT WEIGHT:   220 lbs 0 oz    Initial Weight (lbs): 224 lbs  Last Visits Weight: 99.8 kg (220 lb)  Cumulative weight loss (lbs): 4  Weight Loss Percentage: 1.79%    Changes and Difficulties 1/19/2021   I have made the following changes to my diet since my last visit: same   With regards to my diet, I am still struggling with: can't lose weight   I have made the following changes to my activity/exercise since my last visit: same   With regards to my activity/exercise, I am still struggling with: -       VITALS:  Ht 1.626 m (5' 4\")   Wt 99.8 kg (220 lb)   BMI 37.76 kg/m      MEDICATIONS:   Current Outpatient Medications   Medication Sig Dispense Refill     citalopram (CELEXA) 20 MG tablet Take 20 mg by mouth       Lactobacillus (ACIDOPHILUS) CAPS Take 1 capsule by mouth       latanoprost (XALATAN) 0.005 % ophthalmic solution INSTILL 1 DROP INTO EACH EYE AT BEDTIME       Turmeric 500 MG CAPS Take 500 mg by mouth       vitamin B-12 (CYANOCOBALAMIN) 100 MCG tablet Take 100 mcg by mouth daily         Weight Loss Medication History Reviewed With Patient 1/19/2021   Which weight loss medications are you currently taking on a regular basis?  None   If you are not taking a weight loss medication that was prescribed to you, please indicate why: Other   Are you having any side effects from the weight loss medication that we have " "prescribed you? -   If you are having side effects please describe: holding Ozempic for now. Will discuss today       No results found for any previous visit.       PHYSICAL EXAM:  Objective    Ht 1.626 m (5' 4\")   Wt 99.8 kg (220 lb)   BMI 37.76 kg/m    Vitals - Patient Reported  Pain Score: No Pain (0)        Physical Exam   healthy, alert and no distress  PSYCH: Alert and oriented times 3; coherent speech, normal   rate and volume, able to articulate logical thoughts, able   to abstract reason, no tangential thoughts, no hallucinations   or delusions  Her affect is normal  RESP: No cough, no audible wheezing, able to talk in full sentences  Remainder of exam unable to be completed due to telephone visits      Sincerely,    Jess Morillo NP  "

## 2021-01-19 NOTE — ASSESSMENT & PLAN NOTE
Attempted to use ozempic for weight loss. Patient had diarrhea but did notice some earlier satiety. Patient has high deductible and can no longer afford it.

## 2021-01-19 NOTE — PATIENT INSTRUCTIONS
"It was a pleasure meeting with you today.    Thank you for allowing us the privilege of caring for you. We hope we provided you with the excellent service you deserve.   Please let us know if there is anything else we can do for you so that we can be sure you are leaving completely satisfied with your care experience.    To ensure the quality of our services you may be receiving a patient satisfaction survey from an independent patient satisfaction monitoring company.    The greatest compliment you can give is a \"Likely to Recommend\"      Your visit was with Jess Morillo NP today.     Instructions per today's visit:   -continue off ozempic   -do food journal x 1 week   -Follow up with dietitian   -Try to get records from [facility]  -Follow up with Dr. Roblero     To schedule appointments with our team, please call [phone] option #1    Please call during clinic hours Monday through Friday 8:00a - 4:00p if you have questions or you can contact us via Wellocities at anytime.      Nurses: [phone]  Fax: [phone]   _____________________________________________________________________________________________________________________________    Meal Replacement Products:    Here is the link to our new e-store where you can purchase our meal replacement products    [facility] YCD Multimedia-Store  [email or web address]/store    The one week starter kit is a great way to sample a variety of products and see what works for you.    If you want more information about the product go to: TechLive    Free Shipping for orders over $75     Benefits of meal replacements products:    Portion and calorie control  Improved nutrition  Structured eating  Simplified food choices  Avoid contact with trigger foods  ______________________________________________________________________________________________________________________________    Healthy Lifestyle Support Group    [facility] Weight [facility] " Program  Virtual Support Group Now Available    60 minutes of small group guided discussion, support and resources    Led by Health , Miriam Hamm    For questions, and to receive the invite information, contact Miriam at [email or web address]    All our welcome!    One Friday per month, 12:30pm to 1:30pm  SELF COMPASSION: July 31st  CREATING MY WELLNESS VISION: August 28th  MINDFULNESS PRACTICES FOR A CALM BODY & MIND: September 25th  MINDFUL EATING TOOLS: October 30th  HEALTHY& HAPPY HOLIDAYS: November 20th  OPEN FORUM: December 18th  _______________________________________________________________________________________________________________________________    Connections: Bariatric Care support group  Due to the Covid-19 restrictions, we will be doing our support group virtually using Microsoft Teams. You will need to get an invitation to the group from Faustino Soto, Ph.D., LP at [email or web address] and to learn about using Microsoft Teams. The next meeting is on Tuesday, July 14 between 6:30 and 8 PM and there will be no formal speaker.    This group meets the second Tuesday of each month from 6:30 to 8 PM and will be held again at [facility] in the [address] [place] [address] (A-B room) when the Covid-19 restrictions are lifted. The group is led by Faustino Soto, Ph.D., Licensed Psychologist, [facility] Comprehensive Weight Management Program. There is no cost for group participation and is open to all [facility] (and those external to this program) pre- and post-operative bariatric surgery patients as well as their support system.   _________________________________________________________________________________________________________________________________      Interested in working with a health ?  Health coaches work with you to improve your overall health and wellbeing.  They look at the whole person, and may involve discussion of different areas of life,  including, but not limited to the four pillars of health (sleep, exercise, nutrition, and stress management). Discuss with your care team if you would like to start working a health .     Health Coaching-3 Pack:      $99 for three health coaching visits    Visits may be done in person or via phone    Coaching is a partnership between the  and the client; Coaches do not prescribe or diagnose    Coaching helps inspire the client to reach his/her personal goals   __________________________________________________________________________________________________________________________________    [facility] Get Moving Program    Our team of physical therapists is trained to help you understand and take control of your condition. They will perform a thorough evaluation to determine your ability for activity and develop a customized plan to fit your goals and physical ability.  Scheduling: Unsure if the Get Moving program is right for you? Discuss the program with your medical provider or diabetes educator. You can also call us at [phone] to ask questions or schedule an appointment.   [facility] Get Moving Program  ______________________________________________________________________________________________________________________  Bluetooth Scale:    We hope to provide you with high quality telephone and virtual healthcare visits while social distancing for COVID-19 is necessary, as well as in the future when virtual visits may be more convenient for you.     Our technology team made it possible for Bluetooth scales to send weight measurements to our electronic medical record. This allows weights from you weighing at home to securely flow into the medical record, which will improve telephone and virtual visits.   Additionally, studies have shown that adults actually lose more weight when their weights are automatically sent to someone else, and also that this process is not stressful for those  adults.    Below is a link for purchasing the scale, with a discount code for our patients. You may call your insurance company to see if they will reimburse you for the cost of the scale, as a piece of durable medical equipment. The scales only go up to a weight of 400 pounds. This is an issue and we are working with the developer on increasing this. We found no scales that go over 400lb that have blue-tooth for connecting to [employer].    Scale to purchase: the 3Jam  Body  Scale: https://www.Verical/us/en/body/[email or web address]=aw.ds    Discount Code: We have a discount code for our patients to bring the cost down to $50, the code is: [place]_Scale_20%off    Steps to link the scale to [employer] Secure via an Android Phone (you can always disconnect at any point in the future):  1. The order must be placed first before the patient can access Track My Health within [employer].  2. Download Google Fit jewel from the Google Play Store   a. Log in or register using your Google account   3. Download the Haute Secure jewel from Google Play Store  a. Select add organization   b. Search for [employer] and select it   c. Log into [employer] Secure  d. Select Track My Health   e. Select the green connect my account button   f. When prompted log into your Google account   g. Select okay to confirm the account   4. Download the Withings Health Mate jewel from Google Play Store  a. [place] for 3Jam   b. Go to profile   c. Tap google fit under the Apps section  d. Select the option to activate Google Fit integration   e. Select the same Google account   f. Select okay to confirm the account  g.   Steps to link the scale to [employer] Secure via an iPhone (you can always disconnect at any point in the future):  **Note [employer] is not available for download on an iPad**  1. The order must be placed first before the patient can access Track My Health within [employer] Secure.  2. Locate the Health jewel on your  iPhone.  a. Set up your Apple Health account as prompted  b. The Sources page will show Apps that communicate with your Health mauricio. Once all steps are completed, you should see [employer] and [employer] listed under the Apps section and your iPhone under the devices section.  i. Select Health Mate  1. Under 'ALLOW  Climber.com  TO WRITE DATA ensure the toggle is on for Weight.  2. This will allow the scale to add your weight to the Apple Health  ii. Select [employer]  1. Under 'ALLOW  Jumptap  TO READ DATA ensure the toggle is on for Weight.  2. This allows [employer] to grab the weight from [place] so your provider can see your weights.  3. Download the [employer] mauricio from the Mauricio Store   a. Select add organization                                                  b. Search for [employer] and select it  c. Log into [employer]  d. Select Track My Health   e. Select the green connect my account button   f. Follow prompts to link your device to [employer].  4. Download the Withings health mate mauricio in the Mauricio Store   a. [place] for [employer]   b. Go to profile   c. Tap Health under the Apps section  d. If prompted to allow access with the Health Mauricio, toggle weight on for read and write access.

## 2021-01-29 ENCOUNTER — TELEPHONE (OUTPATIENT)
Dept: ENDOCRINOLOGY | Facility: CLINIC | Age: 61
End: 2021-01-29

## 2021-01-29 NOTE — TELEPHONE ENCOUNTER
LVM for patient.  Per Jess's checkout note, schedule a virtual appointment with either Priscilla Stinson or Rebeca li.  Letter sent.

## 2021-02-05 ENCOUNTER — PATIENT OUTREACH (OUTPATIENT)
Dept: SURGERY | Facility: CLINIC | Age: 61
End: 2021-02-05

## 2021-02-05 NOTE — PROGRESS NOTES
Pre Visit Call and Assessment    Date of call:  02/05/2021    Phone numbers:  Cell number on file:    Telephone Information:   Mobile [phone]       Reached patient/confirmed appointment:  No - left message:   on cell phone  Patient care team/Primary provider:  Carson Navas  [facility] outpatient pharmacy:    [facility] [address] [place] [id number]  Phone: [phone] Fax: [phone]    Referred to:  Dr. Danial Roblero    Reason for visit:  Hernia follow-up

## 2021-02-08 ENCOUNTER — OFFICE VISIT (OUTPATIENT)
Dept: SURGERY | Facility: CLINIC | Age: 61
End: 2021-02-08
Payer: COMMERCIAL

## 2021-02-08 VITALS
BODY MASS INDEX: 38.84 KG/M2 | RESPIRATION RATE: 18 BRPM | WEIGHT: 219.2 LBS | TEMPERATURE: 98 F | SYSTOLIC BLOOD PRESSURE: 154 MMHG | OXYGEN SATURATION: 97 % | HEART RATE: 84 BPM | DIASTOLIC BLOOD PRESSURE: 82 MMHG | HEIGHT: 63 IN

## 2021-02-08 DIAGNOSIS — K43.2 INCISIONAL HERNIA, WITHOUT OBSTRUCTION OR GANGRENE: Primary | ICD-10-CM

## 2021-02-08 DIAGNOSIS — E66.01 MORBID OBESITY (H): ICD-10-CM

## 2021-02-08 PROCEDURE — 99213 OFFICE O/P EST LOW 20 MIN: CPT | Performed by: SURGERY

## 2021-02-08 ASSESSMENT — PAIN SCALES - GENERAL: PAINLEVEL: NO PAIN (0)

## 2021-02-08 ASSESSMENT — MIFFLIN-ST. JEOR: SCORE: 1533.41

## 2021-02-08 NOTE — PROGRESS NOTES
Jessica Irwin returns to clinic for follow-up of her recurrent incisional hernia.  She has stopped smoking and has continued to be without tobacco use.  Her weight loss is insignificant despite working with the weight loss clinic and, to her report, compliance with her diet.  Her hernia remains asymptomatic.  She does hold her abdomen if she coughs.  She asked questions about the risk of living with it versus the risks of surgery.    Assessment:.  Recurrent incisional hernia.  Based on her obesity and complex abdominal anatomy, I think complications of surgery are probably equivalent to the complications of having the hernia.  The hernia is wide-based and is unlikely to strangulate. I explained that to the patient and she seemed reassured.  We discussed the possibility of a getting larger.  I think if she maintains her current weight is unlikely to get significantly larger.  Always be readdressed down the road if it becomes symptomatic.  I encouraged the patient to continue to not use tobacco and to lose weight.  She is motivated to do so and understands the risks and has elected to currently live with a hernia as it is.  She will return if it becomes symptomatic.    I personally reviewed the last CT scan again.    I read the op note from [place] just imported into the clinic from 2018.    Past Medical History:   Diagnosis Date     Benign essential hypertension      Depression      Diabetes mellitus type 2, controlled, without complications (H)      Dyslipidemia      Fatty liver      Glaucoma suspect, bilateral      Morbid obesity (H)      MRSA infection      Nicotine dependence      Ventral hernia without obstruction or gangrene      Past Surgical History:   Procedure Laterality Date     HC REMOVAL GALLBLADDER       HERNIA REPAIR  09/14/2018    Component seperation     HYSTERECTOMY TOTAL ABDOMINAL       ILEOSTOMY  09/14/2018     [place] EXPLORATION OF ABDOMINAL WALL  09/14/2018     TONSILLECTOMY       Exam  Obese, soft,  nontender.  Fascial edges palpable cephalad tot he umbilicus.  Deep scar in midline.  Oblique scar in RUQ.      I spent 25 minutes with the patient, reviewing records, and writing note

## 2021-02-08 NOTE — LETTER
2/8/2021       RE: Jessica Irwin  [address] [address] [facility] [address] [facility] [address]     Dear Colleague,    Thank you for referring your patient, Jessica Irwin, to the [facility] [place] at [facility]. Please see a copy of my visit note below.    Jessica Irwin returns to clinic for follow-up of her recurrent incisional hernia.  She has stopped smoking and has continued to be without tobacco use.  Her weight loss is insignificant despite working with the weight loss clinic and, to her report, compliance with her diet.  Her hernia remains asymptomatic.  She does hold her abdomen if she coughs.  She asked questions about the risk of living with it versus the risks of surgery.    Assessment:.  Recurrent incisional hernia.  Based on her obesity and complex abdominal anatomy, I think complications of surgery are probably equivalent to the complications of having the hernia.  The hernia is wide-based and is unlikely to strangulate. I explained that to the patient and she seemed reassured.  We discussed the possibility of a getting larger.  I think if she maintains her current weight is unlikely to get significantly larger.  Always be readdressed down the road if it becomes symptomatic.  I encouraged the patient to continue to not use tobacco and to lose weight.  She is motivated to do so and understands the risks and has elected to currently live with a hernia as it is.  She will return if it becomes symptomatic.    I personally reviewed the last CT scan again.    I read the op note from [place] just imported into the clinic from 2018.    Past Medical History:   Diagnosis Date     Benign essential hypertension      Depression      Diabetes mellitus type 2, controlled, without complications (H)      Dyslipidemia      Fatty liver      Glaucoma suspect, bilateral      Morbid obesity (H)      MRSA infection      Nicotine dependence      Ventral hernia without  obstruction or gangrene      Past Surgical History:   Procedure Laterality Date     HC REMOVAL GALLBLADDER       HERNIA REPAIR  09/14/2018    Component seperation     HYSTERECTOMY TOTAL ABDOMINAL       ILEOSTOMY  09/14/2018     [place] EXPLORATION OF ABDOMINAL WALL  09/14/2018     TONSILLECTOMY       Exam  Obese, soft, nontender.  Fascial edges palpable cephalad tot he umbilicus.  Deep scar in midline.  Oblique scar in RUQ.      I spent 25 minutes with the patient, reviewing records, and writing note      Again, thank you for allowing me to participate in the care of your patient.      Sincerely,    Danial Roblero MD

## 2021-02-08 NOTE — PATIENT INSTRUCTIONS
You met with Dr. Néstor Roblero.      Today's visit instructions:    Return to the Surgery Clinic when you have met your weight loss goal.     Please call our office with questions.     If you have questions please contact Gio RN or Kallie RN during regular clinic hours, Monday through Friday 7:30 AM - 4:00 PM, or you can contact us via Hopper at anytime.       If you have urgent needs after-hours, weekends, or holidays please call the hospital at [phone] and ask to speak with our on-call General Surgery Team.    Appointment [phone], option #1   Nurse Advice (Gio or Kallie): [phone]   Surgery Scheduler (Nannette): [phone]  Fax: [phone]

## 2021-02-08 NOTE — NURSING NOTE
"Chief Complaint   Patient presents with     RECHECK     Pt here for hernia follow up       Vitals:    02/08/21 1016   BP: (!) 154/82   BP Location: Left arm   Patient Position: Sitting   Cuff Size: Adult Regular   Pulse: 84   Resp: 18   Temp: 98  F (36.7  C)   TempSrc: Oral   SpO2: 97%   Weight: 99.4 kg (219 lb 3.2 oz)   Height: 1.6 m (5' 3\")       Body mass index is 38.83 kg/m .      LIZA Barrow NREMT                      "